# Patient Record
Sex: MALE | Race: WHITE | NOT HISPANIC OR LATINO | Employment: FULL TIME | URBAN - METROPOLITAN AREA
[De-identification: names, ages, dates, MRNs, and addresses within clinical notes are randomized per-mention and may not be internally consistent; named-entity substitution may affect disease eponyms.]

---

## 2017-02-10 ENCOUNTER — ALLSCRIPTS OFFICE VISIT (OUTPATIENT)
Dept: OTHER | Facility: OTHER | Age: 62
End: 2017-02-10

## 2017-03-10 ENCOUNTER — ALLSCRIPTS OFFICE VISIT (OUTPATIENT)
Dept: OTHER | Facility: OTHER | Age: 62
End: 2017-03-10

## 2017-08-18 ENCOUNTER — ALLSCRIPTS OFFICE VISIT (OUTPATIENT)
Dept: OTHER | Facility: OTHER | Age: 62
End: 2017-08-18

## 2018-01-09 NOTE — PROGRESS NOTES
Assessment    1  URTI (acute upper respiratory infection) (465 9) (J06 9)   2  BMI 27 0-27 9,adult (V85 23) (Z68 27)    Plan  URTI (acute upper respiratory infection)    · Amoxicillin 500 MG Oral Tablet; take 1 tablet every twelve hours   · Benzonatate 200 MG Oral Capsule; TAKE 1 CAPSULE 3 TIMES DAILY AS  NEEDED   · Fluticasone Propionate 50 MCG/ACT Nasal Suspension; Two sprays in each nostril  once daily   · Drink at least 6 glasses of water or juice a day ; Status:Complete;   Done: 58STO9470   · The following home treatments may soothe a sore throat ; Status:Complete;   Done:  [id number]   · Use a cough medicine to help you get adequate rest ; Status:Complete;   Done:  09UVY5795   · Call [phone] if: The cough is not gone in 10 days ; Status:Complete;   Done:  36UDQ9953   · Call [phone] if: The fever comes back after being normal for 2 days ;  Status:Complete;   Done: 25UIT3674   · Call [phone] if: The symptoms are not better in 7 days ; Status:Complete;   Done:  50BWV6800   · Call [phone] if: The symptoms seem worse ; Status:Complete;   Done:  55BME5657   · Call [phone] if: You have a productive cough and are bringing up secretions  (phlegm) ; Status:Complete;   Done: 55DLL5920   · Call [phone] if: Your temperature is higher than 101F ; Status:Complete;   Done:  37DQO6590   · Follow Up if Not Better Evaluation and Treatment  Follow-up  Status: Complete  Done:  31LZN9783    Discussion/Summary    Symptoms are viral  recommend supportive care    since it is the weekend--- written script for abx given only to use if symptoms worsen  if you need to use it ---call the office  f/u as needed  Patient is able to Self-Care  Possible side effects of new medications were reviewed with the patient/guardian today  The treatment plan was reviewed with the patient/guardian   The patient/guardian understands and agrees with the treatment plan   The patient was counseled regarding instructions for management, risk factor reductions, impressions, importance of compliance with treatment  Chief Complaint  patient presenting c/o sinus pressure / pain, post nasal drip and sore throat x 2 days sl/cma      History of Present Illness  HPI: Pt seen and examined  +sore throat and sinus pressure x 3 days  + phlegm when laying down  no fever  feels well otherwise  no n/v/d  on and off cough  tried claritin without success      Review of Systems    Constitutional: no fever and no chills  ENT: as noted in HPI  Cardiovascular: no chest pain and no palpitations  Respiratory: as noted in HPI  Gastrointestinal: no nausea, no vomiting and no diarrhea  Musculoskeletal: no arthralgias and no myalgias  Neurological: headache  Active Problems    1  Allergic rhinitis (477 9) (J30 9)   2  Benign essential hypertension (401 1) (I10)   3  Colonoscopy (Fiberoptic) Screening   4  Encounter for screening for malignant neoplasm of colon (V76 51) (Z12 11)   5  Esophageal reflux (530 81) (K21 9)   6  Noncompliance with treatment plan (V15 81) (Z91 11)    Past Medical History    1  Acute pansinusitis (461 8) (J01 40)   2  Acute upper respiratory infection (465 9) (J06 9)   3  History of Bicipital tendinitis of left shoulder (726 12) (M75 22)   4  History of acute bronchitis (V12 69) (Z87 09)   5  History of acute pharyngitis (V12 69) (Z87 09)   6  History of acute sinusitis (V12 69) (Z87 09)   7  History of bronchitis (V12 69) (Z87 09)   8  History of gastroesophageal reflux (GERD) (V12 79) (Z87 19)   9  History of low back pain (V13 59) (Z87 39)   10  History of pneumonia (V12 61) (Z87 01)   11  History of sciatica (V12 49) (Z86 69)   12  History of viral gastroenteritis (V12 09) (Z86 19)   13  Influenza due to unidentified influenza virus with other respiratory manifestation (487 1)    (J11 1)   14  Lateral epicondylitis, unspecified laterality (726 32) (M77 10)   15   History of Left foot pain (729 5) (M79 672)   16  Noninfectious Dermatological Conditions (709 9)   17  History of Rash and other nonspecific skin eruption (782 1) (R21)   18  Rotator cuff tendinitis, unspecified laterality (726 10) (M75 80)   19  History of Skin lesion (709 9) (L98 9)   20  History of Upper respiratory infection (465 9) (J06 9)   21  History of URTI (acute upper respiratory infection) (465 9) (J06 9)   22  Well adult on routine health check (V70 0) (Z00 00)  Active Problems And Past Medical History Reviewed: The active problems and past medical history were reviewed and updated today  Family History  Mother    1  No pertinent family history  Father    2  No pertinent family history    Social History    · Former smoker (I91 76) (J25 721)  The social history was reviewed and updated today  Current Meds   1  Lisinopril 10 MG Oral Tablet; TAKE 1 TABLET BY MOUTH ONCE A DAY; Therapy: 73FBG6481 to (Last Rx:30Oct2016)  Requested for: 30Oct2016 Ordered   2  Omeprazole 40 MG Oral Capsule Delayed Release; TAKE 1 CAPSULE BY MOUTH   ONCE A DAY; Therapy: 08NLC3212 to (Last Rx:12Jan2015)  Requested for: 12Jan2015 Ordered    The medication list was reviewed and updated today  Allergies    1  No Known Drug Allergies    Vitals   Recorded: 45GZQ4918 10:52AM Recorded: 80HOQ4939 10:36AM   Temperature  97 4 F   Heart Rate 84 102   Respiration  16   Systolic  242   Diastolic  82   Height  5 ft 11 in   Weight  199 lb 3 2 oz   BMI Calculated  27 78   BSA Calculated  2 11     Physical Exam    Constitutional   General appearance: No acute distress, well appearing and well nourished  Eyes   Conjunctiva and lids: No swelling, erythema, or discharge  Ears, Nose, Mouth, and Throat   External inspection of ears and nose: Normal     Otoscopic examination: Abnormal   b/l bulging  Oropharynx: Abnormal   +PND  Pulmonary   Respiratory effort: No increased work of breathing or signs of respiratory distress      Auscultation of lungs: Clear to auscultation, equal breath sounds bilaterally, no wheezes, no rales, no rhonci  Cardiovascular   Auscultation of heart: Normal rate and rhythm, normal S1 and S2, without murmurs      Psychiatric   Orientation to person, place and time: Normal          Signatures   Electronically signed by : Sandi Reyes DO; Mar 10 2017 11:28AM EST                       (Author)

## 2018-01-12 VITALS
WEIGHT: 199.2 LBS | TEMPERATURE: 97.4 F | SYSTOLIC BLOOD PRESSURE: 148 MMHG | HEART RATE: 84 BPM | BODY MASS INDEX: 27.89 KG/M2 | DIASTOLIC BLOOD PRESSURE: 82 MMHG | RESPIRATION RATE: 16 BRPM | HEIGHT: 71 IN

## 2018-01-13 VITALS
RESPIRATION RATE: 16 BRPM | BODY MASS INDEX: 27.58 KG/M2 | HEIGHT: 71 IN | DIASTOLIC BLOOD PRESSURE: 84 MMHG | TEMPERATURE: 98.3 F | WEIGHT: 197 LBS | SYSTOLIC BLOOD PRESSURE: 132 MMHG | HEART RATE: 88 BPM

## 2018-01-14 VITALS
HEART RATE: 96 BPM | RESPIRATION RATE: 16 BRPM | SYSTOLIC BLOOD PRESSURE: 160 MMHG | WEIGHT: 201 LBS | DIASTOLIC BLOOD PRESSURE: 90 MMHG | BODY MASS INDEX: 28.14 KG/M2 | HEIGHT: 71 IN | TEMPERATURE: 98.4 F

## 2018-02-06 DIAGNOSIS — I10 BENIGN ESSENTIAL HTN: Primary | ICD-10-CM

## 2018-02-07 ENCOUNTER — TELEPHONE (OUTPATIENT)
Dept: FAMILY MEDICINE CLINIC | Facility: CLINIC | Age: 63
End: 2018-02-07

## 2018-02-07 RX ORDER — LISINOPRIL 10 MG/1
1 TABLET ORAL DAILY
COMMUNITY
Start: 2012-09-21 | End: 2018-02-07 | Stop reason: SDUPTHER

## 2018-02-07 RX ORDER — LISINOPRIL 10 MG/1
TABLET ORAL
Qty: 30 TABLET | Refills: 0 | OUTPATIENT
Start: 2018-02-07

## 2018-02-07 RX ORDER — LISINOPRIL 10 MG/1
10 TABLET ORAL DAILY
Qty: 30 TABLET | Refills: 0 | Status: SHIPPED | OUTPATIENT
Start: 2018-02-07 | End: 2018-03-20 | Stop reason: DRUGHIGH

## 2018-03-20 ENCOUNTER — OFFICE VISIT (OUTPATIENT)
Dept: FAMILY MEDICINE CLINIC | Facility: CLINIC | Age: 63
End: 2018-03-20
Payer: COMMERCIAL

## 2018-03-20 VITALS
HEART RATE: 76 BPM | SYSTOLIC BLOOD PRESSURE: 140 MMHG | DIASTOLIC BLOOD PRESSURE: 100 MMHG | HEIGHT: 71 IN | BODY MASS INDEX: 27.72 KG/M2 | WEIGHT: 198 LBS | TEMPERATURE: 97.7 F

## 2018-03-20 DIAGNOSIS — I10 ESSENTIAL HYPERTENSION: Primary | ICD-10-CM

## 2018-03-20 PROCEDURE — 99213 OFFICE O/P EST LOW 20 MIN: CPT | Performed by: NURSE PRACTITIONER

## 2018-03-20 RX ORDER — LISINOPRIL AND HYDROCHLOROTHIAZIDE 12.5; 1 MG/1; MG/1
1 TABLET ORAL DAILY
Qty: 30 TABLET | Refills: 6 | Status: SHIPPED | OUTPATIENT
Start: 2018-03-20 | End: 2018-04-24

## 2018-03-20 NOTE — PROGRESS NOTES
Assessment/Plan:    Problem List Items Addressed This Visit     None      Visit Diagnoses     Essential hypertension    -  Primary    Relevant Medications    lisinopril-hydrochlorothiazide (PRINZIDE,ZESTORETIC) 10-12 5 MG per tablet        Patient's blood pressure is not controlled  Cardiovascular risk factors include: advanced age (older than 54 for men, 72 for women), hypertension and male gender  Current plan of care: add hctz to their medication regimen  Reviewed risks of hypertension and principles of treatment  Blood pressure goal <140/90  RTO in 4 weeks for BP check, labs    Patient Instructions     Chronic Hypertension   WHAT YOU NEED TO KNOW:   Hypertension is high blood pressure (BP)  Your BP is the force of your blood moving against the walls of your arteries  Normal BP is less than 120/80  Prehypertension is between 120/80 and 139/89  Hypertension is 140/90 or higher  Hypertension causes your BP to get so high that your heart has to work much harder than normal  This can damage your heart  Chronic hypertension is a long-term condition that you can control with a healthy lifestyle or medicines  A controlled blood pressure helps protect your organs, such as your heart, lungs, brain, and kidneys  DISCHARGE INSTRUCTIONS:   Call 911 for any of the following:   · You have discomfort in your chest that feels like squeezing, pressure, fullness, or pain  · You become confused or have difficulty speaking  · You suddenly feel lightheaded or have trouble breathing  · You have pain or discomfort in your back, neck, jaw, stomach, or arm  Return to the emergency department if:   · You have a severe headache or vision loss  · You have weakness in an arm or leg  Contact your healthcare provider if:   · You feel faint, dizzy, confused, or drowsy  · You have been taking your BP medicine and your BP is still higher than your healthcare provider says it should be      · You have questions or concerns about your condition or care  Medicines: You may need any of the following:  · Medicine  may be used to help lower your BP  You may need more than one type of medicine  Take the medicine exactly as directed  · Diuretics  help decrease extra fluid that collects in your body  This will help lower your BP  You may urinate more often while you take this medicine  · Cholesterol medicine  helps lower your cholesterol level  A low cholesterol level helps prevent heart disease and makes it easier to control your blood pressure  · Take your medicine as directed  Contact your healthcare provider if you think your medicine is not helping or if you have side effects  Tell him or her if you are allergic to any medicine  Keep a list of the medicines, vitamins, and herbs you take  Include the amounts, and when and why you take them  Bring the list or the pill bottles to follow-up visits  Carry your medicine list with you in case of an emergency  Follow up with your healthcare provider as directed: You will need to return to have your blood pressure checked and to have other lab tests done  Write down your questions so you remember to ask them during your visits  Manage chronic hypertension:  Talk with your healthcare provider about these and other ways to manage hypertension:  · Take your BP at home  Sit and rest for 5 minutes before you take your BP  Extend your arm and support it on a flat surface  Your arm should be at the same level as your heart  Follow the directions that came with your BP monitor  If possible, take at least 2 BP readings each time  Take your BP at least twice a day at the same times each day, such as morning and evening  Keep a record of your BP readings and bring it to your follow-up visits  Ask your healthcare provider what your blood pressure should be  · Limit sodium (salt) as directed  Too much sodium can affect your fluid balance   Check labels to find low-sodium or no-salt-added foods  Some low-sodium foods use potassium salts for flavor  Too much potassium can also cause health problems  Your healthcare provider will tell you how much sodium and potassium are safe for you to have in a day  He or she may recommend that you limit sodium to 2,300 mg a day  · Follow the meal plan recommended by your healthcare provider  A dietitian or your provider can give you more information on low-sodium plans or the DASH (Dietary Approaches to Stop Hypertension) eating plan  The DASH plan is low in sodium, unhealthy fats, and total fat  It is high in potassium, calcium, and fiber  · Exercise to maintain a healthy weight  Exercise at least 30 minutes per day, on most days of the week  This will help decrease your blood pressure  Ask about the best exercise plan for you  · Decrease stress  This may help lower your BP  Learn ways to relax, such as deep breathing or listening to music  · Limit alcohol  Women should limit alcohol to 1 drink a day  Men should limit alcohol to 2 drinks a day  A drink of alcohol is 12 ounces of beer, 5 ounces of wine, or 1½ ounces of liquor  · Do not smoke  Nicotine and other chemicals in cigarettes and cigars can increase your BP and also cause lung damage  Ask your healthcare provider for information if you currently smoke and need help to quit  E-cigarettes or smokeless tobacco still contain nicotine  Talk to your healthcare provider before you use these products  © 2017 [employer]  Information is for End User's use only and may not be sold, redistributed or otherwise used for commercial purposes  All illustrations and images included in CareNotes® are the copyrighted property of [employer]  or [employer]hu Walker  The above information is an  only  It is not intended as medical advice for individual conditions or treatments   Talk to your doctor, nurse or pharmacist before following any medical regimen to see if it is safe and effective for you  Return in about 4 weeks (around 4/17/2018)  Subjective:      Patient ID: Azalea Ricci is a 63 y o  male  Chief Complaint   Patient presents with    Hypertension     BP check / med refills        Hypertension   This is a chronic problem  The current episode started more than 1 year ago  The problem is unchanged  The problem is controlled  (Asymptomatic) There are no associated agents to hypertension  Risk factors for coronary artery disease include male gender  Past treatments include ACE inhibitors  The current treatment provides mild improvement  There are no compliance problems  no evidence  The following portions of the patient's history were reviewed and updated as appropriate: allergies, current medications, past family history, past medical history, past social history, past surgical history and problem list     Review of Systems   Constitutional: Negative  Eyes: Negative for visual disturbance  Respiratory: Negative  Cardiovascular: Negative  Neurological: Negative  Current Outpatient Prescriptions   Medication Sig Dispense Refill    lisinopril-hydrochlorothiazide (PRINZIDE,ZESTORETIC) 10-12 5 MG per tablet Take 1 tablet by mouth daily 30 tablet 6     No current facility-administered medications for this visit  Objective:    /100   Pulse 76   Temp 97 7 °F (36 5 °C)   Ht 5' 11" (1 803 m)   Wt 89 8 kg (198 lb)   BMI 27 62 kg/m²        Physical Exam   Constitutional: He is oriented to person, place, and time  He appears well-developed and well-nourished  No distress  Eyes: Conjunctivae and EOM are normal  Pupils are equal, round, and reactive to light  Neck: Carotid bruit is not present  No thyromegaly present  Cardiovascular: Normal rate, regular rhythm, normal heart sounds and intact distal pulses  No murmur heard    Pulmonary/Chest: Effort normal and breath sounds normal  No respiratory distress  Neurological: He is alert and oriented to person, place, and time  Vitals reviewed               Erlinda Noel, [address]

## 2018-03-20 NOTE — PATIENT INSTRUCTIONS

## 2018-04-24 ENCOUNTER — OFFICE VISIT (OUTPATIENT)
Dept: FAMILY MEDICINE CLINIC | Facility: CLINIC | Age: 63
End: 2018-04-24
Payer: COMMERCIAL

## 2018-04-24 VITALS
DIASTOLIC BLOOD PRESSURE: 94 MMHG | SYSTOLIC BLOOD PRESSURE: 158 MMHG | TEMPERATURE: 98.1 F | BODY MASS INDEX: 26.88 KG/M2 | WEIGHT: 192 LBS | HEART RATE: 84 BPM | HEIGHT: 71 IN

## 2018-04-24 DIAGNOSIS — Z13.220 LIPID SCREENING: ICD-10-CM

## 2018-04-24 DIAGNOSIS — K21.9 GASTROESOPHAGEAL REFLUX DISEASE WITHOUT ESOPHAGITIS: ICD-10-CM

## 2018-04-24 DIAGNOSIS — I10 BENIGN ESSENTIAL HYPERTENSION: Primary | ICD-10-CM

## 2018-04-24 DIAGNOSIS — Z79.899 DRUG THERAPY: ICD-10-CM

## 2018-04-24 DIAGNOSIS — Z13.1 DIABETES MELLITUS SCREENING: ICD-10-CM

## 2018-04-24 PROCEDURE — 99213 OFFICE O/P EST LOW 20 MIN: CPT | Performed by: NURSE PRACTITIONER

## 2018-04-24 PROCEDURE — 36415 COLL VENOUS BLD VENIPUNCTURE: CPT | Performed by: NURSE PRACTITIONER

## 2018-04-24 RX ORDER — LISINOPRIL AND HYDROCHLOROTHIAZIDE 20; 12.5 MG/1; MG/1
1 TABLET ORAL DAILY
Qty: 30 TABLET | Refills: 6 | Status: SHIPPED | OUTPATIENT
Start: 2018-04-24 | End: 2019-02-13 | Stop reason: SDUPTHER

## 2018-04-24 NOTE — PROGRESS NOTES
Assessment/Plan:    Problem List Items Addressed This Visit     Benign essential hypertension - Primary    Relevant Medications    lisinopril-hydrochlorothiazide (PRINZIDE,ZESTORETIC) 20-12 5 MG per tablet    Other Relevant Orders    CBC and Platelet    Lipid panel    TSH, 3rd generation    Magnesium    Comprehensive metabolic panel    Esophageal reflux    Relevant Orders    CBC and Platelet      Other Visit Diagnoses     Diabetes mellitus screening        Relevant Orders    Comprehensive metabolic panel    Lipid screening        Relevant Orders    Lipid panel    Drug therapy        Relevant Orders    CBC and Platelet    Lipid panel    TSH, 3rd generation    Magnesium    Comprehensive metabolic panel        Patient's blood pressure is not controlled  Cardiovascular risk factors include: advanced age (older than 54 for men, 72 for women), hypertension, male gender and sedentary lifestyle  Current plan of care: medication changes per orders, dietary sodium restriction, regular aerobic exercise, weight loss and reduce stress  Reviewed risks of hypertension and principles of treatment  Blood pressure goal <140/90  rto in 2 months for bp check    Labs drawn today    I have reviewed the  instructions with the patient, answering all questions to his satisfaction  Patient Instructions     Chronic Hypertension   WHAT YOU NEED TO KNOW:   Hypertension is high blood pressure (BP)  Your BP is the force of your blood moving against the walls of your arteries  Normal BP is less than 120/80  Prehypertension is between 120/80 and 139/89  Hypertension is 140/90 or higher  Hypertension causes your BP to get so high that your heart has to work much harder than normal  This can damage your heart  Chronic hypertension is a long-term condition that you can control with a healthy lifestyle or medicines  A controlled blood pressure helps protect your organs, such as your heart, lungs, brain, and kidneys     DISCHARGE INSTRUCTIONS: Call 911 for any of the following:   · You have discomfort in your chest that feels like squeezing, pressure, fullness, or pain  · You become confused or have difficulty speaking  · You suddenly feel lightheaded or have trouble breathing  · You have pain or discomfort in your back, neck, jaw, stomach, or arm  Return to the emergency department if:   · You have a severe headache or vision loss  · You have weakness in an arm or leg  Contact your healthcare provider if:   · You feel faint, dizzy, confused, or drowsy  · You have been taking your BP medicine and your BP is still higher than your healthcare provider says it should be  · You have questions or concerns about your condition or care  Medicines: You may need any of the following:  · Medicine  may be used to help lower your BP  You may need more than one type of medicine  Take the medicine exactly as directed  · Diuretics  help decrease extra fluid that collects in your body  This will help lower your BP  You may urinate more often while you take this medicine  · Cholesterol medicine  helps lower your cholesterol level  A low cholesterol level helps prevent heart disease and makes it easier to control your blood pressure  · Take your medicine as directed  Contact your healthcare provider if you think your medicine is not helping or if you have side effects  Tell him or her if you are allergic to any medicine  Keep a list of the medicines, vitamins, and herbs you take  Include the amounts, and when and why you take them  Bring the list or the pill bottles to follow-up visits  Carry your medicine list with you in case of an emergency  Follow up with your healthcare provider as directed: You will need to return to have your blood pressure checked and to have other lab tests done  Write down your questions so you remember to ask them during your visits    Manage chronic hypertension:  Talk with your healthcare provider about these and other ways to manage hypertension:  · Take your BP at home  Sit and rest for 5 minutes before you take your BP  Extend your arm and support it on a flat surface  Your arm should be at the same level as your heart  Follow the directions that came with your BP monitor  If possible, take at least 2 BP readings each time  Take your BP at least twice a day at the same times each day, such as morning and evening  Keep a record of your BP readings and bring it to your follow-up visits  Ask your healthcare provider what your blood pressure should be  · Limit sodium (salt) as directed  Too much sodium can affect your fluid balance  Check labels to find low-sodium or no-salt-added foods  Some low-sodium foods use potassium salts for flavor  Too much potassium can also cause health problems  Your healthcare provider will tell you how much sodium and potassium are safe for you to have in a day  He or she may recommend that you limit sodium to 2,300 mg a day  · Follow the meal plan recommended by your healthcare provider  A dietitian or your provider can give you more information on low-sodium plans or the DASH (Dietary Approaches to Stop Hypertension) eating plan  The DASH plan is low in sodium, unhealthy fats, and total fat  It is high in potassium, calcium, and fiber  · Exercise to maintain a healthy weight  Exercise at least 30 minutes per day, on most days of the week  This will help decrease your blood pressure  Ask about the best exercise plan for you  · Decrease stress  This may help lower your BP  Learn ways to relax, such as deep breathing or listening to music  · Limit alcohol  Women should limit alcohol to 1 drink a day  Men should limit alcohol to 2 drinks a day  A drink of alcohol is 12 ounces of beer, 5 ounces of wine, or 1½ ounces of liquor  · Do not smoke    Nicotine and other chemicals in cigarettes and cigars can increase your BP and also cause lung damage  Ask your healthcare provider for information if you currently smoke and need help to quit  E-cigarettes or smokeless tobacco still contain nicotine  Talk to your healthcare provider before you use these products  © 2017 [employer] Information is for End User's use only and may not be sold, redistributed or otherwise used for commercial purposes  All illustrations and images included in CareNotes® are the copyrighted property of [employer]  or [employer]hu Walker  The above information is an  only  It is not intended as medical advice for individual conditions or treatments  Talk to your doctor, nurse or pharmacist before following any medical regimen to see if it is safe and effective for you  Return in about 2 months (around 6/24/2018)  Subjective:      Patient ID: Raghav Koenig is a 63 y o  male  Chief Complaint   Patient presents with    Blood Pressure Check     follow up 1 month       Hypertension   This is a chronic problem  The current episode started more than 1 year ago  The problem is unchanged  The problem is controlled  Pertinent negatives include no anxiety, blurred vision, chest pain, headaches, malaise/fatigue, neck pain, orthopnea, palpitations, peripheral edema, PND, shortness of breath or sweats  (Asymptomatic) There are no associated agents to hypertension  Risk factors for coronary artery disease include male gender  Past treatments include ACE inhibitors  The current treatment provides mild improvement  There are no compliance problems  no evidence  The following portions of the patient's history were reviewed and updated as appropriate: allergies, current medications, past family history, past medical history, past social history, past surgical history and problem list     Review of Systems   Constitutional: Negative  Negative for malaise/fatigue  Eyes: Negative for blurred vision and visual disturbance     Respiratory: Negative  Negative for shortness of breath  Cardiovascular: Negative  Negative for chest pain, palpitations, orthopnea and PND  Gastrointestinal: Negative  Musculoskeletal: Negative for neck pain  Neurological: Negative  Negative for headaches  Psychiatric/Behavioral: Negative  Current Outpatient Prescriptions   Medication Sig Dispense Refill    lisinopril-hydrochlorothiazide (PRINZIDE,ZESTORETIC) 20-12 5 MG per tablet Take 1 tablet by mouth daily 30 tablet 6     No current facility-administered medications for this visit  Objective:    /94 (BP Location: Left arm, Patient Position: Sitting, Cuff Size: Adult)   Pulse 84   Temp 98 1 °F (36 7 °C) (Temporal)   Ht 5' 11" (1 803 m)   Wt 87 1 kg (192 lb)   BMI 26 78 kg/m²        Physical Exam   Constitutional: He is oriented to person, place, and time  He appears well-developed and well-nourished  No distress  Eyes: Conjunctivae and EOM are normal  Pupils are equal, round, and reactive to light  Neck: Carotid bruit is not present  No thyromegaly present  Cardiovascular: Normal rate, regular rhythm, normal heart sounds and intact distal pulses  No murmur heard  Pulmonary/Chest: Effort normal and breath sounds normal  No respiratory distress  Neurological: He is alert and oriented to person, place, and time  Vitals reviewed               [facility], [address] [facility]

## 2018-04-25 ENCOUNTER — DOCUMENTATION (OUTPATIENT)
Dept: FAMILY MEDICINE CLINIC | Facility: CLINIC | Age: 63
End: 2018-04-25

## 2018-04-25 DIAGNOSIS — E78.5 DYSLIPIDEMIA: Primary | ICD-10-CM

## 2018-04-25 LAB
ALBUMIN SERPL-MCNC: 4.7 G/DL (ref 3.6–4.8)
ALBUMIN/GLOB SERPL: 1.9 {RATIO} (ref 1.2–2.2)
ALP SERPL-CCNC: 52 IU/L (ref 39–117)
ALT SERPL-CCNC: 34 IU/L (ref 0–44)
AST SERPL-CCNC: 33 IU/L (ref 0–40)
BILIRUB SERPL-MCNC: 1 MG/DL (ref 0–1.2)
BUN SERPL-MCNC: 14 MG/DL (ref 8–27)
BUN/CREAT SERPL: 14 (ref 10–24)
CALCIUM SERPL-MCNC: 9.9 MG/DL (ref 8.6–10.2)
CHLORIDE SERPL-SCNC: 98 MMOL/L (ref 96–106)
CHOLEST SERPL-MCNC: 227 MG/DL (ref 100–199)
CO2 SERPL-SCNC: 29 MMOL/L (ref 18–29)
CREAT SERPL-MCNC: 1.03 MG/DL (ref 0.76–1.27)
ERYTHROCYTE [DISTWIDTH] IN BLOOD BY AUTOMATED COUNT: 14.7 % (ref 12.3–15.4)
GLOBULIN SER-MCNC: 2.5 G/DL (ref 1.5–4.5)
GLUCOSE SERPL-MCNC: 84 MG/DL (ref 65–99)
HCT VFR BLD AUTO: 46.2 % (ref 37.5–51)
HDLC SERPL-MCNC: 55 MG/DL
HGB BLD-MCNC: 15.8 G/DL (ref 13–17.7)
LDLC SERPL CALC-MCNC: 141 MG/DL (ref 0–99)
MAGNESIUM SERPL-MCNC: 2.3 MG/DL (ref 1.6–2.3)
MCH RBC QN AUTO: 29.3 PG (ref 26.6–33)
MCHC RBC AUTO-ENTMCNC: 34.2 G/DL (ref 31.5–35.7)
MCV RBC AUTO: 86 FL (ref 79–97)
MICRODELETION SYND BLD/T FISH: NORMAL
PLATELET # BLD AUTO: 253 X10E3/UL (ref 150–379)
POTASSIUM SERPL-SCNC: 5 MMOL/L (ref 3.5–5.2)
PROT SERPL-MCNC: 7.2 G/DL (ref 6–8.5)
RBC # BLD AUTO: 5.39 X10E6/UL (ref 4.14–5.8)
SL AMB EGFR AFRICAN AMERICAN: 91 ML/MIN/1.73
SL AMB EGFR NON AFRICAN AMERICAN: 79 ML/MIN/1.73
SL AMB VLDL CHOLESTEROL CALC: 31 MG/DL (ref 5–40)
SODIUM SERPL-SCNC: 140 MMOL/L (ref 134–144)
TRIGL SERPL-MCNC: 153 MG/DL (ref 0–149)
TSH SERPL DL<=0.005 MIU/L-ACNC: 1.54 UIU/ML (ref 0.45–4.5)
WBC # BLD AUTO: 7.7 X10E3/UL (ref 3.4–10.8)

## 2018-04-25 RX ORDER — ATORVASTATIN CALCIUM 10 MG/1
10 TABLET, FILM COATED ORAL DAILY
Qty: 30 TABLET | Refills: 6 | Status: SHIPPED | OUTPATIENT
Start: 2018-04-25 | End: 2018-12-13 | Stop reason: SDUPTHER

## 2018-05-07 ENCOUNTER — OFFICE VISIT (OUTPATIENT)
Dept: FAMILY MEDICINE CLINIC | Facility: CLINIC | Age: 63
End: 2018-05-07
Payer: COMMERCIAL

## 2018-05-07 VITALS
WEIGHT: 192 LBS | SYSTOLIC BLOOD PRESSURE: 150 MMHG | HEART RATE: 68 BPM | DIASTOLIC BLOOD PRESSURE: 80 MMHG | BODY MASS INDEX: 26.88 KG/M2 | TEMPERATURE: 98.1 F | HEIGHT: 71 IN

## 2018-05-07 DIAGNOSIS — M75.52 ACUTE BURSITIS OF LEFT SHOULDER: Primary | ICD-10-CM

## 2018-05-07 DIAGNOSIS — K21.9 GASTROESOPHAGEAL REFLUX DISEASE WITHOUT ESOPHAGITIS: ICD-10-CM

## 2018-05-07 PROCEDURE — 99213 OFFICE O/P EST LOW 20 MIN: CPT | Performed by: NURSE PRACTITIONER

## 2018-05-07 RX ORDER — OMEPRAZOLE 40 MG/1
40 CAPSULE, DELAYED RELEASE ORAL DAILY
Qty: 30 CAPSULE | Refills: 6 | Status: SHIPPED | OUTPATIENT
Start: 2018-05-07

## 2018-05-07 RX ORDER — KETOROLAC TROMETHAMINE 30 MG/ML
45 INJECTION, SOLUTION INTRAMUSCULAR; INTRAVENOUS ONCE
Status: COMPLETED | OUTPATIENT
Start: 2018-05-07 | End: 2018-05-07

## 2018-05-07 RX ORDER — METHYLPREDNISOLONE ACETATE 40 MG/ML
60 INJECTION, SUSPENSION INTRA-ARTICULAR; INTRALESIONAL; INTRAMUSCULAR; SOFT TISSUE ONCE
Status: COMPLETED | OUTPATIENT
Start: 2018-05-07 | End: 2018-05-07

## 2018-05-07 RX ADMIN — METHYLPREDNISOLONE ACETATE 60 MG: 40 INJECTION, SUSPENSION INTRA-ARTICULAR; INTRALESIONAL; INTRAMUSCULAR; SOFT TISSUE at 10:36

## 2018-05-07 RX ADMIN — KETOROLAC TROMETHAMINE 45 MG: 30 INJECTION, SOLUTION INTRAMUSCULAR; INTRAVENOUS at 10:35

## 2018-05-07 NOTE — PROGRESS NOTES
Subjective     Renard Geiger is a 63 y o  male who presents with left shoulder pain  The symptoms began a week ago  Aggravating factors: repetitive activity, heavy lifting  Pain is located diffusely throughout the shoulder and bicep, deltoid  Discomfort is described as aching and throbbing  Symptoms are exacerbated by repetitive movements and lying on the shoulder  Denies decrease ROM, paresthesias, fever, chills  Evaluation to date: none  Therapy to date includes: rest and OTC analgesics which are not very effective  Hx of similar sxs  The following portions of the patient's history were reviewed and updated as appropriate: allergies, current medications, past family history, past medical history, past social history, past surgical history and problem list     Review of Systems  Pertinent items are noted in HPI       Objective     /80 (BP Location: Left arm, Patient Position: Sitting, Cuff Size: Adult)   Pulse 68   Temp 98 1 °F (36 7 °C) (Temporal)   Ht 5' 11" (1 803 m)   Wt 87 1 kg (192 lb)   BMI 26 78 kg/m²   Right shoulder: normal active ROM, no tenderness, no impingement sign   Left shoulder: non-specific diffuse tenderness about the shoulder, full ROM, negative for impingement sign, sensory exam normal, motor exam normal and radial pulse intact       Assessment/Plan     Left shoulder pain  Natural history and expected course discussed  Questions answered  Educational material distributed  Reduction in offending activity  Gentle ROM exercises  Rest, ice, compression, and elevation (RICE) therapy  NSAIDs per medication orders  Follow up in 1 days  if not improved      Relative rest for 3 days  Sling for one week    Maddisonlisa Jacobson requesting refill of GERd med

## 2018-05-07 NOTE — PATIENT INSTRUCTIONS
Shoulder Bursitis   WHAT YOU NEED TO KNOW:   What is shoulder bursitis? Shoulder bursitis is inflammation of the bursa in your shoulder  The bursa is a fluid-filled sac that acts as a cushion between a bone and a tendon  A tendon is a cord of strong tissue that connects muscles to bones  What causes shoulder bursitis? · An injury, such as a fall    · Bacterial infection    · Overuse of the shoulder, such as when you paint or swim    · Bony growths that rub against and irritate the bursa and tendons  What are the signs and symptoms of shoulder bursitis? · Pain when you move your shoulder or raise your arm over your head    · Decreased movement of your arm and shoulder    · Redness or swelling    · Crunching or popping when you move your shoulder    · Shoulder and arm weakness  How is shoulder bursitis diagnosed? Your healthcare provider will examine your shoulder and ask about your injury or activities  You may need any of the following:  · Blood tests: You may need blood drawn to check for infection  Healthcare providers may also check for diseases that may be causing your bursitis, such as rheumatoid arthritis  · X-rays: These pictures will show bone position problems, arthritis, or a fracture  · MRI:  This scan uses powerful magnets and a computer to take pictures of your shoulder  An MRI may show tissue damage or arthritis  You may be given dye to help the pictures show up better  Tell the healthcare provider if you have ever had an allergic reaction to contrast dye  Do not enter the MRI room with anything metal  Metal can cause serious injury  Tell the healthcare provider if you have any metal in or on your body  · Fluid culture:  Healthcare providers use a needle to drain fluid from your bursa  The fluid will be sent to a lab and tested for infection  Removal of bursa fluid may also help relieve your symptoms  How is shoulder bursitis treated?    · Medicine:      ¨ NSAIDs:  These medicines decrease swelling, pain, and fever  NSAIDs are available without a doctor's order  Ask your healthcare provider which medicine is right for you  Ask how much to take and when to take it  Take as directed  NSAIDs can cause stomach bleeding and kidney problems if not taken correctly  ¨ Antibiotics: These help fight an infection caused by bacteria  You may need antibiotics if your bursitis is caused by infection  ¨ Steroid injection: This shot will help decrease pain and swelling  · Bursectomy: This is surgery to remove your bursa  It is only done when other treatments do not work  What are the risks of shoulder bursitis? The infection may spread to nearby joints  You may have long-term bursitis  This may include pain and severe limitation of movement  How can I manage my symptoms? · Rest:  Rest your shoulder as much as possible to decrease pain and swelling  Slowly start to do more each day  Return to your daily activities as directed  · Ice:  Ice helps decrease swelling and pain  Ice may also help prevent tissue damage  Use an ice pack, or put crushed ice in a plastic bag  Cover it with a towel and place it on your shoulder for 15 to 20 minutes, 3 to 4 times each day, as directed  · Heat:  Heat helps decrease pain and stiffness  Apply heat on the area for 15 to 20 minutes, 3 to 4 times each day, as directed  · Sleep position:  Try to avoid lying on your injured shoulder  You may be more comfortable if you sleep on your stomach or back  · Physical therapy:  A physical therapist teaches you exercises to help improve movement and strength, and to decrease pain  How can I prevent shoulder bursitis? Always stretch and do warmup and cool-down exercises before and after you exercise  This will help loosen your muscles and decrease stress on your shoulder  Rest between workouts  When should I contact my healthcare provider? · Your redness, pain, and swelling increase      · Your symptoms do not improve with treatment  · You have a fever  · You have questions or concerns about your condition or care  CARE AGREEMENT:   You have the right to help plan your care  Learn about your health condition and how it may be treated  Discuss treatment options with your caregivers to decide what care you want to receive  You always have the right to refuse treatment  The above information is an  only  It is not intended as medical advice for individual conditions or treatments  Talk to your doctor, nurse or pharmacist before following any medical regimen to see if it is safe and effective for you  © 2017 [facility] Information is for End User's use only and may not be sold, redistributed or otherwise used for commercial purposes  All illustrations and images included in CareNotes® are the copyrighted property of A D A M [employer]  or Raghu Walker

## 2018-05-07 NOTE — LETTER
May 7, 2018     Patient: Anand Rivera   YOB: 1955   Date of Visit: 5/7/2018       To Whom it May Concern:    Anand Rivera is under my professional care  He was seen in my office on 5/7/2018  He may return to work on 5/10/18  If you have any questions or concerns, please don't hesitate to call           Sincerely,          JOHN Siddiqui        CC: No Recipients

## 2018-05-15 ENCOUNTER — TRANSCRIBE ORDERS (OUTPATIENT)
Dept: RADIOLOGY | Facility: CLINIC | Age: 63
End: 2018-05-15

## 2018-05-15 ENCOUNTER — TELEPHONE (OUTPATIENT)
Dept: FAMILY MEDICINE CLINIC | Facility: CLINIC | Age: 63
End: 2018-05-15

## 2018-05-15 ENCOUNTER — APPOINTMENT (OUTPATIENT)
Dept: RADIOLOGY | Facility: CLINIC | Age: 63
End: 2018-05-15
Payer: COMMERCIAL

## 2018-05-15 ENCOUNTER — OFFICE VISIT (OUTPATIENT)
Dept: FAMILY MEDICINE CLINIC | Facility: CLINIC | Age: 63
End: 2018-05-15
Payer: COMMERCIAL

## 2018-05-15 VITALS
SYSTOLIC BLOOD PRESSURE: 140 MMHG | DIASTOLIC BLOOD PRESSURE: 90 MMHG | HEIGHT: 71 IN | HEART RATE: 80 BPM | WEIGHT: 192 LBS | BODY MASS INDEX: 26.88 KG/M2 | TEMPERATURE: 98.2 F

## 2018-05-15 DIAGNOSIS — R20.2 PARESTHESIAS: ICD-10-CM

## 2018-05-15 DIAGNOSIS — M25.512 ACUTE PAIN OF LEFT SHOULDER: Primary | ICD-10-CM

## 2018-05-15 DIAGNOSIS — M25.512 ACUTE PAIN OF LEFT SHOULDER: ICD-10-CM

## 2018-05-15 DIAGNOSIS — M75.42 IMPINGEMENT SYNDROME OF LEFT SHOULDER: ICD-10-CM

## 2018-05-15 PROCEDURE — 73030 X-RAY EXAM OF SHOULDER: CPT

## 2018-05-15 PROCEDURE — 99213 OFFICE O/P EST LOW 20 MIN: CPT | Performed by: NURSE PRACTITIONER

## 2018-05-15 RX ORDER — METHYLPREDNISOLONE 4 MG/1
TABLET ORAL
Qty: 21 TABLET | Refills: 0 | Status: SHIPPED | OUTPATIENT
Start: 2018-05-15 | End: 2018-07-08 | Stop reason: ALTCHOICE

## 2018-05-15 RX ORDER — TRAMADOL HYDROCHLORIDE 50 MG/1
50 TABLET ORAL EVERY 12 HOURS PRN
Qty: 30 TABLET | Refills: 0 | Status: SHIPPED | OUTPATIENT
Start: 2018-05-15 | End: 2018-06-04 | Stop reason: HOSPADM

## 2018-05-15 NOTE — TELEPHONE ENCOUNTER
CALLED PT ADVISED SAME, HE MADE APPT  W/ ORTHO BUT THEY CANT SEE HIM UNTIL MAY 25, HE SAID HES IN A LOT OF PAIN AND CAN HE HAVE SOMETHING FOR THE PAIN?   HE USES QUICK CHEK

## 2018-05-15 NOTE — PROGRESS NOTES
Subjective     Lars Pena is a 63 y o  male who presents with ongoing left shoulder pain  The symptoms began 2 weeks ago  Seen and evaluated in ffice 1 week ago  Was given toradol, depo medrol and advised aleve, relative rest, sling  He reports pain is no better  In fact he now c/o left forearm, hand paresthesias  Aggravating factors: repetitive activity, heavy lifting  Pain is located diffusely throughout the shoulder and bicep, deltoid  Discomfort is described as aching and throbbing  Symptoms are exacerbated by repetitive movements and lying on the shoulder  Denies decrease ROM, joint swelling or redness, fever, chills  The following portions of the patient's history were reviewed and updated as appropriate: allergies, current medications, past family history, past medical history, past social history, past surgical history and problem list     Review of Systems  Pertinent items are noted in HPI       Objective     /90 (BP Location: Left arm, Patient Position: Sitting, Cuff Size: Adult)   Pulse 80   Temp 98 2 °F (36 8 °C) (Temporal)   Ht 5' 11" (1 803 m)   Wt 87 1 kg (192 lb)   BMI 26 78 kg/m²   Right shoulder: normal active ROM, no tenderness, no impingement sign   Left shoulder: non-specific diffuse tenderness about the shoulder, full ROM, negative for impingement sign, sensory exam normal, motor exam normal and radial pulse intact       Assessment/Plan     Left shoulder pain  Cont plan of care  Check XR  Will need MRI but will defer to Ortho     Ortho referral    Work note given

## 2018-05-15 NOTE — LETTER
May 15, 2018     Patient: Chelo Awad   YOB: 1955   Date of Visit: 5/15/2018       To Whom It May Concern: It is my medical opinion that Chelo Awad may return to work on 5/17/18  If you have any questions or concerns, please don't hesitate to call           Sincerely,        JOHN Velazquez    CC: No Recipients

## 2018-05-15 NOTE — PATIENT INSTRUCTIONS
Shoulder Pain   AMBULATORY CARE:   Shoulder pain  is a common problem and can affect your daily activities  Pain can be caused by a problem within your shoulder  Shoulder pain may also be caused by pain that spreads to your shoulder from another part of your body  Seek care immediately if:   · You have severe pain  · You cannot move your arm or shoulder  · You have numbness or tingling in your shoulder or arm  Contact your healthcare provider if:   · Your pain gets worse or does not go away with treatment  · You have trouble moving your arm or shoulder  · You have questions or concerns about your condition or care  Treatment for shoulder pain  may include any of the following:  · Acetaminophen  decreases pain and fever  It is available without a doctor's order  Ask how much to take and how often to take it  Follow directions  Acetaminophen can cause liver damage if not taken correctly  · NSAIDs , such as ibuprofen, help decrease swelling, pain, and fever  This medicine is available with or without a doctor's order  NSAIDs can cause stomach bleeding or kidney problems in certain people  If you take blood thinner medicine, always ask your healthcare provider if NSAIDs are safe for you  Always read the medicine label and follow directions  · A steroid injection  may help decrease pain and swelling  · Surgery  may be needed for long-term pain and loss of function  Manage your symptoms:   · Apply ice  on your shoulder for 20 to 30 minutes every 2 hours or as directed  Use an ice pack, or put crushed ice in a plastic bag  Cover it with a towel  Ice helps prevent tissue damage and decreases swelling and pain  · Apply heat if ice does not help your symptoms  Apply heat on your shoulder for 20 to 30 minutes every 2 hours for as many days as directed  Heat helps decrease pain and muscle spasms  · Go to physical or occupational therapy as directed    A physical therapist teaches you exercises to help improve movement and strength, and to decrease pain  An occupational therapist teaches you skills to help with your daily activities  Prevent shoulder pain:   · Stretch and strengthen your shoulder  Use proper technique during exercises and sports  · Limit activities as directed  Try to avoid repeated overhead movements  Follow up with your healthcare provider or orthopedist as directed:  Write down your questions so you remember to ask them during your visits  © 2017 [facility] Information is for End User's use only and may not be sold, redistributed or otherwise used for commercial purposes  All illustrations and images included in CareNotes® are the copyrighted property of [employer] D A M , Inc  or Raghu Walker  The above information is an  only  It is not intended as medical advice for individual conditions or treatments  Talk to your doctor, nurse or pharmacist before following any medical regimen to see if it is safe and effective for you

## 2018-05-15 NOTE — TELEPHONE ENCOUNTER
----- Message from Lorin Pichardo, [address] sent at 5/15/2018 12:54 PM EDT -----  XR shows slight narrowing in joint  This may be causing sxs  MRI is in order however ortho should order the proper type

## 2018-05-15 NOTE — TELEPHONE ENCOUNTER
I escribed tramadol 50mg morning and night and medrol pack  He should review pharm info  No alcohol with tramadol  He should follow pack instructions for steroid   Enough tramadol ordered until he sees ortho

## 2018-05-25 ENCOUNTER — OFFICE VISIT (OUTPATIENT)
Dept: OBGYN CLINIC | Facility: CLINIC | Age: 63
End: 2018-05-25
Payer: COMMERCIAL

## 2018-05-25 ENCOUNTER — APPOINTMENT (OUTPATIENT)
Dept: RADIOLOGY | Facility: CLINIC | Age: 63
End: 2018-05-25
Payer: COMMERCIAL

## 2018-05-25 ENCOUNTER — TELEPHONE (OUTPATIENT)
Dept: PAIN MEDICINE | Facility: CLINIC | Age: 63
End: 2018-05-25

## 2018-05-25 VITALS
HEIGHT: 71 IN | SYSTOLIC BLOOD PRESSURE: 151 MMHG | DIASTOLIC BLOOD PRESSURE: 91 MMHG | BODY MASS INDEX: 27.66 KG/M2 | WEIGHT: 197.6 LBS | HEART RATE: 88 BPM

## 2018-05-25 DIAGNOSIS — M54.2 NECK PAIN: ICD-10-CM

## 2018-05-25 DIAGNOSIS — M54.12 RADICULOPATHY, CERVICAL REGION: Primary | ICD-10-CM

## 2018-05-25 DIAGNOSIS — R20.2 PARESTHESIAS: ICD-10-CM

## 2018-05-25 DIAGNOSIS — M25.512 ACUTE PAIN OF LEFT SHOULDER: ICD-10-CM

## 2018-05-25 PROCEDURE — 72040 X-RAY EXAM NECK SPINE 2-3 VW: CPT

## 2018-05-25 PROCEDURE — 99244 OFF/OP CNSLTJ NEW/EST MOD 40: CPT | Performed by: ORTHOPAEDIC SURGERY

## 2018-05-25 NOTE — PROGRESS NOTES
Assessment/Plan:  1  Radiculopathy, cervical region  MRI cervical spine wo contrast    Ambulatory referral to Pain Management   2  Acute pain of left shoulder  Ambulatory referral to Orthopedic Surgery    worsening pain  not responsive to NSAIDs, steroids   3  Paresthesias  Ambulatory referral to Orthopedic Surgery    left arm   4  Neck pain  XR spine cervical 2 or 3 vw injury     Scribe Attestation    I,:   Denver Reyes am acting as a scribe while in the presence of the attending physician :        I,:   Sharon Trinh MD personally performed the services described in this documentation    as scribed in my presence :              Plan  Upon physical examination and review of the x-rays, I suspect that Ed's symptoms are related to a cervical spine pathology  I am recommending a cervical spine MRI to further evaluate for a cervical disc herniation or nerve root pathology, and we'll assist in scheduling this in the office today  Additionally, I'm recommending he follow-up with Dr Angelica Meléndez to evaluate the results of the MRI and formulate a more detailed treatment plan  Karlie Reyes understands and is in agreement with this plan  Subjective:   Patient ID: Kristin White is a 63 y o  male  Karlie Reyes is a 70-year-old right-hand-dominant male here today for initial evaluation of his left shoulder pain  He is referred today by Dr Shasha Duarte, and accompanied today by his wife  He reports that his left shoulder pain started approximately 3 weeks ago when he awoke in the morning thinking he had just slept on his shoulder wrong  He reports the pain has continued since then, and he describes his pain as achy and constant in nature with activities such as driving, sleeping, or even when sitting  He reports he received an injection about 2 weeks ago, which failed to help improve his symptoms  He rates his pain as moderate in nature, and does report that ice helps reduce his pain   He has radiating pain from his left shoulder into his left upper arm, as well as tingling into his left fingers  He denies any prior left shoulder injury, but does report he has received injections in the past which has helped reduce his symptoms  He works as a  on the night shift, and reports she does a lot of pushing and pulling of equipment and material at work  Review of Systems   Constitutional: Negative for chills, fever and unexpected weight change  HENT: Negative for hearing loss, nosebleeds and sore throat  Eyes: Negative for pain, redness and visual disturbance  Respiratory: Negative for cough, shortness of breath and wheezing  Cardiovascular: Negative for chest pain, palpitations and leg swelling  Gastrointestinal: Negative for abdominal pain, nausea and vomiting  Endocrine: Negative for polydipsia and polyuria  Genitourinary: Negative for dysuria and hematuria  Musculoskeletal: Positive for arthralgias and myalgias  Negative for joint swelling  Skin: Negative for rash and wound  Neurological: Negative for dizziness, numbness and headaches  Psychiatric/Behavioral: Negative for decreased concentration and suicidal ideas  The patient is not nervous/anxious  Past Medical History:   Diagnosis Date    Acute pansinusitis     Bicipital tendinitis of left shoulder     GERD (gastroesophageal reflux disease)     Lateral epicondylitis     Noninfectious dermatoses of eyelid     Pneumonia     Rotator cuff tendinitis     Sciatica     Skin lesion        History reviewed  No pertinent surgical history  Family History   Problem Relation Age of Onset    No Known Problems Mother     No Known Problems Father        Social History     Occupational History    Not on file       Social History Main Topics    Smoking status: Former Smoker    Smokeless tobacco: Never Used    Alcohol use Not on file    Drug use: Unknown    Sexual activity: Not on file         Current Outpatient Prescriptions:    atorvastatin (LIPITOR) 10 mg tablet, Take 1 tablet (10 mg total) by mouth daily for 30 days, Disp: 30 tablet, Rfl: 6    lisinopril-hydrochlorothiazide (PRINZIDE,ZESTORETIC) 20-12 5 MG per tablet, Take 1 tablet by mouth daily, Disp: 30 tablet, Rfl: 6    Methylprednisolone 4 MG TBPK, Use as directed on package, Disp: 21 tablet, Rfl: 0    omeprazole (PriLOSEC) 40 MG capsule, Take 1 capsule (40 mg total) by mouth daily, Disp: 30 capsule, Rfl: 6    traMADol (ULTRAM) 50 mg tablet, Take 1 tablet (50 mg total) by mouth every 12 (twelve) hours as needed for moderate pain, Disp: 30 tablet, Rfl: 0    No Known Allergies    Objective:  Vitals:    05/25/18 1317   BP: 151/91   Pulse: 88       Back Exam     Tenderness   The patient is experiencing no tenderness  Range of Motion   The patient has normal back ROM  Extension: normal   Flexion: normal   Lateral Bend Right: normal   Lateral Bend Left: normal   Rotation Right: normal   Rotation Left: normal     Other   Sensation: normal    Comments:  Spurling's: Positive  Cervical Compression: Positive  Upper Quarter Screen- within normal limits, normal strength distally  Radicular symptoms: C5 and C6 dermatomal distribution      Left Shoulder Exam   Left shoulder exam is normal     Tenderness   The patient is experiencing no tenderness  Range of Motion   The patient has normal left shoulder ROM  Active Abduction: normal   Forward Flexion: normal   External Rotation: normal   Internal Rotation 0 degrees: normal     Muscle Strength   Abduction: 5/5   Internal Rotation: 5/5   External Rotation: 5/5   Supraspinatus: 5/5   Subscapularis: 5/5   Biceps: 5/5     Tests   Apprehension: negative  Cross Arm: negative  Drop Arm: negative  Hawkin's test: negative    Other   Scars: absent  Sensation: normal  Pulse: present     Comments:  Empty can: Negative  Arab's: Negative            Physical Exam   Constitutional: He is oriented to person, place, and time   He appears well-developed and well-nourished  HENT:   Head: Normocephalic  Nose: Nose normal    Eyes: Conjunctivae and EOM are normal    Neck: Normal range of motion  Cardiovascular: Normal rate and intact distal pulses  Pulmonary/Chest: Effort normal  No respiratory distress  Abdominal: Soft  He exhibits no distension  Musculoskeletal:        Left shoulder: He exhibits pain  He exhibits normal range of motion, no tenderness, no bony tenderness, normal pulse and normal strength  Cervical back: He exhibits normal range of motion, no tenderness, no bony tenderness, no pain and normal pulse  Arms:  Neurological: He is alert and oriented to person, place, and time  Skin: Skin is warm and dry  Psychiatric: He has a normal mood and affect  His behavior is normal  Judgment and thought content normal        I have personally reviewed pertinent films in PACS and my interpretation is Left shoulder x-rays are unremarkable, no obvious fracture or deformity noted  Cervical spine x-rays demonstrate arthritic changes at the C4/C5 level  The x-rays are otherwise unremarkable, no additional fracture or deformity noted

## 2018-05-25 NOTE — LETTER
May 25, 2018     Patient: Bobo Botello   YOB: 1955   Date of Visit: 5/25/2018       To Whom it May Concern:    Bobo Botello is under my professional care  He was seen in my office on 5/25/2018  He may return to work on 06/04/2018  If you have any questions or concerns, please don't hesitate to call           Sincerely,          Edin Montiel MD        CC: No Recipients

## 2018-05-25 NOTE — TELEPHONE ENCOUNTER
Intake started and completed at [facility] office patient ref by Dr Aleah Okeefe for Radiculopathy, cervical region  Patient scheduled with Dr Rosalio Marshall on 06/04/18 and gave patient new patient paperwork

## 2018-05-31 ENCOUNTER — HOSPITAL ENCOUNTER (OUTPATIENT)
Dept: RADIOLOGY | Facility: HOSPITAL | Age: 63
Discharge: HOME | End: 2018-05-31
Attending: ORTHOPAEDIC SURGERY
Payer: COMMERCIAL

## 2018-05-31 DIAGNOSIS — M54.12 RADICULOPATHY, CERVICAL REGION: ICD-10-CM

## 2018-05-31 PROCEDURE — 72141 MRI NECK SPINE W/O DYE: CPT

## 2018-06-04 ENCOUNTER — CONSULT (OUTPATIENT)
Dept: PAIN MEDICINE | Facility: CLINIC | Age: 63
End: 2018-06-04
Payer: COMMERCIAL

## 2018-06-04 VITALS
HEART RATE: 98 BPM | SYSTOLIC BLOOD PRESSURE: 120 MMHG | BODY MASS INDEX: 28.17 KG/M2 | WEIGHT: 201.2 LBS | RESPIRATION RATE: 18 BRPM | HEIGHT: 71 IN | DIASTOLIC BLOOD PRESSURE: 64 MMHG

## 2018-06-04 DIAGNOSIS — M50.322 DEGENERATION OF INTERVERTEBRAL DISC AT C5-C6 LEVEL: ICD-10-CM

## 2018-06-04 DIAGNOSIS — M54.12 RADICULOPATHY, CERVICAL REGION: ICD-10-CM

## 2018-06-04 DIAGNOSIS — M54.2 NECK PAIN: Primary | ICD-10-CM

## 2018-06-04 DIAGNOSIS — M48.02 CERVICAL SPINAL STENOSIS: ICD-10-CM

## 2018-06-04 PROCEDURE — 99244 OFF/OP CNSLTJ NEW/EST MOD 40: CPT | Performed by: ANESTHESIOLOGY

## 2018-06-04 RX ORDER — ASPIRIN 81 MG/1
81 TABLET ORAL DAILY
COMMUNITY

## 2018-06-04 RX ORDER — GABAPENTIN 300 MG/1
300 CAPSULE ORAL 3 TIMES DAILY
Qty: 90 CAPSULE | Refills: 0 | Status: SHIPPED | OUTPATIENT
Start: 2018-06-04 | End: 2018-10-02 | Stop reason: ALTCHOICE

## 2018-06-04 RX ORDER — ASCORBIC ACID 500 MG
500 TABLET ORAL DAILY
COMMUNITY

## 2018-06-04 RX ORDER — LANOLIN ALCOHOL/MO/W.PET/CERES
CREAM (GRAM) TOPICAL DAILY
COMMUNITY

## 2018-06-04 NOTE — PROGRESS NOTES
Assessment:  1  Neck pain    2  Radiculopathy, cervical region    3  Cervical spinal stenosis    4  Degeneration of intervertebral disc at C5-C6 level        Plan:  Orders Placed This Encounter   Procedures    Ambulatory referral to Physical Therapy     Standing Status:   Future     Standing Expiration Date:   12/4/2018     Referral Priority:   Routine     Referral Type:   Physical Therapy     Referral Reason:   Specialty Services Required     Requested Specialty:   Physical Therapy     Number of Visits Requested:   1     Expiration Date:   6/4/2019       New Medications Ordered This Visit   Medications    gabapentin (NEURONTIN) 300 mg capsule     Sig: Take 1 capsule (300 mg total) by mouth 3 (three) times a day     Dispense:  90 capsule     Refill:  0     My impressions and treatment recommendations were discussed in detail with the patient, who verbalized understanding and had no further questions  Given that the patient has signs and symptoms of neck pain and left upper extremity radiculopathy in the context of cervical spinal stenosis and cervical degenerative disc disease, I discussed the rationale of undergoing a C6-C7 cervical epidural steroid injection since this could be potentially therapeutic  The procedures, its risks, and benefits were explained in detail to the patient  Risks include but are not limited to bleeding, infection, hematoma formation, abscess formation, weakness, headache, failure the pain to improve, nerve irritation or damage, and potential worsening of the pain  The patient verbalized understanding and wished to proceed with the procedure  I also felt it reasonable to have the patient undergo a course of physical therapy 2-3 times per week for 4-6 weeks  In addition, I felt a reasonable to prescribe the patient gabapentin on a titration schedule to a goal dosage of gabapentin 300 mg 3 times daily  Side effects were reviewed with the patient      Follow-up is planned in 4 weeks time or sooner as warranted  Discharge instructions were provided  I personally saw and examined the patient and I agree with the above discussed plan of care  History of Present Illness:    Elton Jeffery is a 63 y o  male who presents to [facility] for initial evaluation of the above stated pain complaints  The patient has a past medical and chronic pain history as outlined in the assessment section  He was referred by Dr Bhupendra Gupta  At today's office visit, the patient's pain score is 9/10 on the verbal numerical pain rating scale  He has been reporting neck pain with left upper extremity radicular symptoms for the past 1 month  He describes his pain as moderate to severe and nearly constant in nature  He describes no typical pattern to his pain  The patient reports that his pain is dull/aching and pins and needles in the fingers    Patient reports that there are no pain worsening factors  Lying down and sitting increases pain  Does report that heat/ice treatment provides no pain relief  He is currently using ibuprofen 600 mg 3 times daily as needed for pain, but is not getting any relief of symptoms with this medication  He is currently employed full-time  for [employer]  Review of Systems:    Review of Systems   Constitutional: Negative for fever and unexpected weight change  HENT: Negative for trouble swallowing  Eyes: Negative for visual disturbance  Respiratory: Negative for shortness of breath and wheezing  Cardiovascular: Negative for chest pain and palpitations  Gastrointestinal: Negative for constipation, diarrhea, nausea and vomiting  Endocrine: Negative for cold intolerance, heat intolerance and polydipsia  Genitourinary: Negative for difficulty urinating and frequency  Musculoskeletal: Negative for arthralgias, gait problem, joint swelling and myalgias  Skin: Negative for rash     Neurological: Negative for dizziness, seizures, syncope, weakness and headaches  Hematological: Does not bruise/bleed easily  Psychiatric/Behavioral: Negative for dysphoric mood  All other systems reviewed and are negative  Patient Active Problem List   Diagnosis    Allergic rhinitis    Benign essential hypertension    Esophageal reflux    Neck pain    Radiculopathy, cervical region    Cervical spinal stenosis    Degeneration of intervertebral disc at C5-C6 level       Past Medical History:   Diagnosis Date    Acute pansinusitis     Bicipital tendinitis of left shoulder     GERD (gastroesophageal reflux disease)     Lateral epicondylitis     Noninfectious dermatoses of eyelid     Pneumonia     Rotator cuff tendinitis     Sciatica     Skin lesion        No past surgical history on file  Family History   Problem Relation Age of Onset    No Known Problems Mother     No Known Problems Father        Social History     Occupational History    Not on file       Social History Main Topics    Smoking status: Former Smoker    Smokeless tobacco: Never Used    Alcohol use Not on file    Drug use: Unknown    Sexual activity: Not on file         Current Outpatient Prescriptions:     lisinopril-hydrochlorothiazide (PRINZIDE,ZESTORETIC) 20-12 5 MG per tablet, Take 1 tablet by mouth daily, Disp: 30 tablet, Rfl: 6    Methylprednisolone 4 MG TBPK, Use as directed on package, Disp: 21 tablet, Rfl: 0    omeprazole (PriLOSEC) 40 MG capsule, Take 1 capsule (40 mg total) by mouth daily, Disp: 30 capsule, Rfl: 6    atorvastatin (LIPITOR) 10 mg tablet, Take 1 tablet (10 mg total) by mouth daily for 30 days, Disp: 30 tablet, Rfl: 6    gabapentin (NEURONTIN) 300 mg capsule, Take 1 capsule (300 mg total) by mouth 3 (three) times a day, Disp: 90 capsule, Rfl: 0    No Known Allergies    Physical Exam:    /64 (BP Location: Left arm, Patient Position: Sitting, Cuff Size: Standard)   Pulse 98   Resp 18   Ht 5' 11" (1 803 m) Wt 91 3 kg (201 lb 3 2 oz)   BMI 28 06 kg/m²     Constitutional: normal, well developed, well nourished, alert, in no distress and non-toxic and no overt pain behavior  Eyes: anicteric  HEENT: grossly intact  Neck: supple, symmetric, trachea midline and no masses   Pulmonary:even and unlabored  Cardiovascular:No edema or pitting edema present  Skin:Normal without rashes or lesions and well hydrated  Psychiatric:Mood and affect appropriate  Neurologic:Cranial Nerves II-XII grossly intact  Musculoskeletal:normal     Cervical Spine Exam    Appearance:  Normal lordosis  Palpation/Tenderness:  no tenderness or spasm  Sensory:  no sensory deficits noted  Range of Motion:  Flexion:  No limitation  without pain  Extension:  No limitation  without pain  Lateral Flexion - Left:  No limitation  without pain  Lateral Flexion - Right:  No limitation  without pain  Rotation - Left:  No limitation  without pain  Rotation - Right:  No limitation  without pain  Motor Strength:  Left Arm Flexion  5/5  Left Arm Extension  5/5  Right Arm Flexion  5/5  Right Arm Extension  5/5  Left Wrist Flexion  5/5  Left Wrist Extension  5/5  Left Finger Abduction  5/5  Right Finger Abduction  5/5  Left Pincer Grasp  5/5  Right Pincer Grasp  5/5  Left    5/5  Right   5/5  Reflexes:  Left Biceps:  2+   Right Biceps:  2+   Left Brachioradialis:  2+   Right Brachioradialis:  2+   Left Triceps:  2+   Right Triceps:  2+   Special Tests:  Left Spurlings:  negative  Right Spurlings  negative      Imaging  No orders to display   5/31/18  MRI CERVICAL SPINE WITHOUT CONTRAST     INDICATION: M54 12: Radiculopathy, cervical region left C5 radiculopathy      COMPARISON:  X-ray 5/25/2018     TECHNIQUE:  Sagittal T1, sagittal T2, sagittal inversion recovery, axial T2, axial  2D merge     IMAGE QUALITY:  Diagnostic     FINDINGS:     ALIGNMENT:  Minor straightening of normal cervical lordosis    Minor degree of congenital canal stenosis      MARROW SIGNAL:  Normal marrow signal is identified within the visualized bony structures  No discrete marrow lesion  Chronic reactive marrow changes C4-C5      CERVICAL AND VISUALIZED THORACIC CORD:  Normal signal within the visualized cord      PREVERTEBRAL AND PARASPINAL SOFT TISSUES:  Normal      VISUALIZED POSTERIOR FOSSA:  The visualized posterior fossa demonstrates no abnormal signal      CERVICAL DISC SPACES:     C2-C3:  Normal      C3-C4:  Minor facet     C4-C5:  Decreased disc height, circumferential bulge, bilateral facet and uncinate arthrosis moderate bilateral foraminal stenosis  Canal reduced to 7-8 mm  Moderate left greater than right foraminal stenosis      C5-C6:  Circumferential bulge, marginal osteophytes, bilateral facet and uncinate arthrosis  Mild right and moderate left foraminal stenosis  Canal 8-9 mm      C6-C7:  Minor bulge, small marginal osteophytes, facet and uncinate arthrosis without critical stenosis      C7-T1:  Normal      UPPER THORACIC DISC SPACES:  Normal      IMPRESSION:     Despite multilevel degenerative changes, extent of foraminal stenosis on the left does not appear to result in focal nerve root compression    Mild canal stenosis              Workstation performed: [id number]      Imaging     MRI cervical spine wo contrast (Order #[id number]) on 5/31/2018 - Imaging Information       Orders Placed This Encounter   Procedures    Ambulatory referral to Physical Therapy

## 2018-06-04 NOTE — LETTER
June 4, 2018     Patient: Lars Pena   YOB: 1955   Date of Visit: 6/4/2018       To Whom it May Concern:    Lars Pena is under my professional care  He was seen in my office on 6/4/2018  He may return to work on June 5, 2018  If you have any questions or concerns, please don't hesitate to call           Sincerely,          Caprice Alanis MD        CC: Lars Pena

## 2018-06-04 NOTE — LETTER
June 4, 2018     Lesly Keller MD  [facility] [address]    Patient: Migel Fajardo   YOB: 1955   Date of Visit: 6/4/2018       Dear Dr Kiersten Lopez: Thank you for referring Migel Fajardo to me for evaluation  Below are my notes for this consultation  If you have questions, please do not hesitate to call me  I look forward to following your patient along with you           Sincerely,        Anne Matos MD        CC: Syeda Minor, DO

## 2018-06-13 ENCOUNTER — HOSPITAL ENCOUNTER (OUTPATIENT)
Dept: RADIOLOGY | Facility: HOSPITAL | Age: 63
Setting detail: OUTPATIENT SURGERY
Discharge: HOME | End: 2018-06-13
Payer: COMMERCIAL

## 2018-06-13 ENCOUNTER — HOSPITAL ENCOUNTER (OUTPATIENT)
Facility: AMBULARY SURGERY CENTER | Age: 63
Setting detail: OUTPATIENT SURGERY
Discharge: HOME | End: 2018-06-13
Attending: ANESTHESIOLOGY | Admitting: ANESTHESIOLOGY
Payer: COMMERCIAL

## 2018-06-13 VITALS
TEMPERATURE: 98.4 F | RESPIRATION RATE: 18 BRPM | DIASTOLIC BLOOD PRESSURE: 94 MMHG | HEART RATE: 85 BPM | SYSTOLIC BLOOD PRESSURE: 158 MMHG | OXYGEN SATURATION: 99 %

## 2018-06-13 PROCEDURE — 72020 X-RAY EXAM OF SPINE 1 VIEW: CPT

## 2018-06-13 PROCEDURE — 62321 NJX INTERLAMINAR CRV/THRC: CPT | Performed by: ANESTHESIOLOGY

## 2018-06-13 RX ORDER — METHYLPREDNISOLONE ACETATE 80 MG/ML
INJECTION, SUSPENSION INTRA-ARTICULAR; INTRALESIONAL; INTRAMUSCULAR; SOFT TISSUE AS NEEDED
Status: DISCONTINUED | OUTPATIENT
Start: 2018-06-13 | End: 2018-06-13 | Stop reason: HOSPADM

## 2018-06-13 RX ORDER — LIDOCAINE WITH 8.4% SOD BICARB 0.9%(10ML)
SYRINGE (ML) INJECTION AS NEEDED
Status: DISCONTINUED | OUTPATIENT
Start: 2018-06-13 | End: 2018-06-13 | Stop reason: HOSPADM

## 2018-06-13 RX ORDER — METHYLPREDNISOLONE ACETATE 40 MG/ML
INJECTION, SUSPENSION INTRA-ARTICULAR; INTRALESIONAL; INTRAMUSCULAR; SOFT TISSUE AS NEEDED
Status: DISCONTINUED | OUTPATIENT
Start: 2018-06-13 | End: 2018-06-13 | Stop reason: HOSPADM

## 2018-06-13 NOTE — H&P (VIEW-ONLY)
Assessment:  1  Neck pain    2  Radiculopathy, cervical region    3  Cervical spinal stenosis    4  Degeneration of intervertebral disc at C5-C6 level        Plan:  Orders Placed This Encounter   Procedures    Ambulatory referral to Physical Therapy     Standing Status:   Future     Standing Expiration Date:   12/4/2018     Referral Priority:   Routine     Referral Type:   Physical Therapy     Referral Reason:   Specialty Services Required     Requested Specialty:   Physical Therapy     Number of Visits Requested:   1     Expiration Date:   6/4/2019       New Medications Ordered This Visit   Medications    gabapentin (NEURONTIN) 300 mg capsule     Sig: Take 1 capsule (300 mg total) by mouth 3 (three) times a day     Dispense:  90 capsule     Refill:  0     My impressions and treatment recommendations were discussed in detail with the patient, who verbalized understanding and had no further questions  Given that the patient has signs and symptoms of neck pain and left upper extremity radiculopathy in the context of cervical spinal stenosis and cervical degenerative disc disease, I discussed the rationale of undergoing a C6-C7 cervical epidural steroid injection since this could be potentially therapeutic  The procedures, its risks, and benefits were explained in detail to the patient  Risks include but are not limited to bleeding, infection, hematoma formation, abscess formation, weakness, headache, failure the pain to improve, nerve irritation or damage, and potential worsening of the pain  The patient verbalized understanding and wished to proceed with the procedure  I also felt it reasonable to have the patient undergo a course of physical therapy 2-3 times per week for 4-6 weeks  In addition, I felt a reasonable to prescribe the patient gabapentin on a titration schedule to a goal dosage of gabapentin 300 mg 3 times daily  Side effects were reviewed with the patient      Follow-up is planned in 4 weeks time or sooner as warranted  Discharge instructions were provided  I personally saw and examined the patient and I agree with the above discussed plan of care  History of Present Illness:    Anand Rivera is a 63 y o  male who presents to [facility] for initial evaluation of the above stated pain complaints  The patient has a past medical and chronic pain history as outlined in the assessment section  He was referred by Dr Melvi Dillon  At today's office visit, the patient's pain score is 9/10 on the verbal numerical pain rating scale  He has been reporting neck pain with left upper extremity radicular symptoms for the past 1 month  He describes his pain as moderate to severe and nearly constant in nature  He describes no typical pattern to his pain  The patient reports that his pain is dull/aching and pins and needles in the fingers    Patient reports that there are no pain worsening factors  Lying down and sitting increases pain  Does report that heat/ice treatment provides no pain relief  He is currently using ibuprofen 600 mg 3 times daily as needed for pain, but is not getting any relief of symptoms with this medication  He is currently employed full-time  for [employer]  Review of Systems:    Review of Systems   Constitutional: Negative for fever and unexpected weight change  HENT: Negative for trouble swallowing  Eyes: Negative for visual disturbance  Respiratory: Negative for shortness of breath and wheezing  Cardiovascular: Negative for chest pain and palpitations  Gastrointestinal: Negative for constipation, diarrhea, nausea and vomiting  Endocrine: Negative for cold intolerance, heat intolerance and polydipsia  Genitourinary: Negative for difficulty urinating and frequency  Musculoskeletal: Negative for arthralgias, gait problem, joint swelling and myalgias  Skin: Negative for rash     Neurological: Negative for dizziness, seizures, syncope, weakness and headaches  Hematological: Does not bruise/bleed easily  Psychiatric/Behavioral: Negative for dysphoric mood  All other systems reviewed and are negative  Patient Active Problem List   Diagnosis    Allergic rhinitis    Benign essential hypertension    Esophageal reflux    Neck pain    Radiculopathy, cervical region    Cervical spinal stenosis    Degeneration of intervertebral disc at C5-C6 level       Past Medical History:   Diagnosis Date    Acute pansinusitis     Bicipital tendinitis of left shoulder     GERD (gastroesophageal reflux disease)     Lateral epicondylitis     Noninfectious dermatoses of eyelid     Pneumonia     Rotator cuff tendinitis     Sciatica     Skin lesion        No past surgical history on file  Family History   Problem Relation Age of Onset    No Known Problems Mother     No Known Problems Father        Social History     Occupational History    Not on file       Social History Main Topics    Smoking status: Former Smoker    Smokeless tobacco: Never Used    Alcohol use Not on file    Drug use: Unknown    Sexual activity: Not on file         Current Outpatient Prescriptions:     lisinopril-hydrochlorothiazide (PRINZIDE,ZESTORETIC) 20-12 5 MG per tablet, Take 1 tablet by mouth daily, Disp: 30 tablet, Rfl: 6    Methylprednisolone 4 MG TBPK, Use as directed on package, Disp: 21 tablet, Rfl: 0    omeprazole (PriLOSEC) 40 MG capsule, Take 1 capsule (40 mg total) by mouth daily, Disp: 30 capsule, Rfl: 6    atorvastatin (LIPITOR) 10 mg tablet, Take 1 tablet (10 mg total) by mouth daily for 30 days, Disp: 30 tablet, Rfl: 6    gabapentin (NEURONTIN) 300 mg capsule, Take 1 capsule (300 mg total) by mouth 3 (three) times a day, Disp: 90 capsule, Rfl: 0    No Known Allergies    Physical Exam:    /64 (BP Location: Left arm, Patient Position: Sitting, Cuff Size: Standard)   Pulse 98   Resp 18   Ht 5' 11" (1 803 m) Wt 91 3 kg (201 lb 3 2 oz)   BMI 28 06 kg/m²     Constitutional: normal, well developed, well nourished, alert, in no distress and non-toxic and no overt pain behavior  Eyes: anicteric  HEENT: grossly intact  Neck: supple, symmetric, trachea midline and no masses   Pulmonary:even and unlabored  Cardiovascular:No edema or pitting edema present  Skin:Normal without rashes or lesions and well hydrated  Psychiatric:Mood and affect appropriate  Neurologic:Cranial Nerves II-XII grossly intact  Musculoskeletal:normal     Cervical Spine Exam    Appearance:  Normal lordosis  Palpation/Tenderness:  no tenderness or spasm  Sensory:  no sensory deficits noted  Range of Motion:  Flexion:  No limitation  without pain  Extension:  No limitation  without pain  Lateral Flexion - Left:  No limitation  without pain  Lateral Flexion - Right:  No limitation  without pain  Rotation - Left:  No limitation  without pain  Rotation - Right:  No limitation  without pain  Motor Strength:  Left Arm Flexion  5/5  Left Arm Extension  5/5  Right Arm Flexion  5/5  Right Arm Extension  5/5  Left Wrist Flexion  5/5  Left Wrist Extension  5/5  Left Finger Abduction  5/5  Right Finger Abduction  5/5  Left Pincer Grasp  5/5  Right Pincer Grasp  5/5  Left    5/5  Right   5/5  Reflexes:  Left Biceps:  2+   Right Biceps:  2+   Left Brachioradialis:  2+   Right Brachioradialis:  2+   Left Triceps:  2+   Right Triceps:  2+   Special Tests:  Left Spurlings:  negative  Right Spurlings  negative      Imaging  No orders to display   5/31/18  MRI CERVICAL SPINE WITHOUT CONTRAST     INDICATION: M54 12: Radiculopathy, cervical region left C5 radiculopathy      COMPARISON:  X-ray 5/25/2018     TECHNIQUE:  Sagittal T1, sagittal T2, sagittal inversion recovery, axial T2, axial  2D merge     IMAGE QUALITY:  Diagnostic     FINDINGS:     ALIGNMENT:  Minor straightening of normal cervical lordosis    Minor degree of congenital canal stenosis      MARROW SIGNAL:  Normal marrow signal is identified within the visualized bony structures  No discrete marrow lesion  Chronic reactive marrow changes C4-C5      CERVICAL AND VISUALIZED THORACIC CORD:  Normal signal within the visualized cord      PREVERTEBRAL AND PARASPINAL SOFT TISSUES:  Normal      VISUALIZED POSTERIOR FOSSA:  The visualized posterior fossa demonstrates no abnormal signal      CERVICAL DISC SPACES:     C2-C3:  Normal      C3-C4:  Minor facet     C4-C5:  Decreased disc height, circumferential bulge, bilateral facet and uncinate arthrosis moderate bilateral foraminal stenosis  Canal reduced to 7-8 mm  Moderate left greater than right foraminal stenosis      C5-C6:  Circumferential bulge, marginal osteophytes, bilateral facet and uncinate arthrosis  Mild right and moderate left foraminal stenosis  Canal 8-9 mm      C6-C7:  Minor bulge, small marginal osteophytes, facet and uncinate arthrosis without critical stenosis      C7-T1:  Normal      UPPER THORACIC DISC SPACES:  Normal      IMPRESSION:     Despite multilevel degenerative changes, extent of foraminal stenosis on the left does not appear to result in focal nerve root compression    Mild canal stenosis              Workstation performed: [id number]      Imaging     MRI cervical spine wo contrast (Order #[id number]) on 5/31/2018 - Imaging Information       Orders Placed This Encounter   Procedures    Ambulatory referral to Physical Therapy

## 2018-06-13 NOTE — DISCHARGE INSTRUCTIONS
Epidural Steroid Injection   WHAT YOU NEED TO KNOW:   An epidural steroid injection (JOEL) is a procedure to inject steroid medicine into the epidural space  The epidural space is between your spinal cord and vertebrae  Steroids reduce inflammation and fluid buildup in your spine that may be causing pain  You may be given pain medicine along with the steroids  ACTIVITY  · Do not drive or operate machinery today  · No strenuous activity today - bending, lifting, etc   · You may resume normal activites starting tomorrow - start slowly and as tolerated  · You may shower today, but no tub baths or hot tubs  · You may have numbness for several hours from the local anesthetic  Please use caution and common sense, especially with weight-bearing activities  CARE OF THE INJECTION SITE  · If you have soreness or pain, apply ice to the area today (20 minutes on/20 minutes off)  · Starting tomorrow, you may use warm, moist heat or ice if needed  · You may have an increase or change in your discomfort for 36-48 hours after your treatment  · Apply ice and continue with any pain medication you have been prescribed  · Notify [facility] if you have any of the following: redness, drainage, swelling, headache, stiff neck or fever above 100°F     SPECIAL INSTRUCTIONS  · Our office will contact you in approximately 7 days for a progress report  MEDICATIONS  · Continue to take all routine medications  · Our office may have instructed you to hold some medications  If you have a problem specifically related to your procedure, please call our office at [phone]  Problems not related to your procedure should be directed to your primary care physician

## 2018-06-13 NOTE — OP NOTE
ATTENDING PHYSICIAN:  Yelena Gonzalez MD     PROCEDURE:  Cervical epidural steroid injection with steroid and local anesthetic under fluoroscopy at the C6-C7 level  PREPROCEDURE DIAGNOSIS:  Neck pain and upper extremity radicular symptoms  POSTPROCEDURE DIAGNOSIS:  Neck pain and upper extremity radicular symptoms  ANESTHESIA:  Local     ESTIMATED BLOOD LOSS:  Minimal     COMPLICATIONS:  None  LOCATION:  [address]  CONSENT:  Today's procedure, its potential benefits as well as its risks and potential side effects were reviewed  Discussed risks of the procedure including bleeding, infection, nerve irritation or damage, reactions to the medications, headache, failure of the pain to improve, and potential worsening of the pain were explained to the patient who verbalized understanding and who wished to proceed  Written informed consent is thereby obtained  DESCRIPTION OF THE PROCEDURE:  After written informed consent was obtained, the patient was taken to the fluoroscopy suite and placed in the prone position  Anatomical landmarks were identified by way of fluoroscopy in multiple views  The skin of the cervical region was prepped and draped in the usual sterile fashion  Strict aseptic technique was utilized  The skin and subcutaneous tissues at the needle entry site were infiltrated with 3 mL of 1% preservative-free lidocaine using a 25-gauge 1-1/2-inch needle  A 20-gauge Tuohy needle was then incrementally advanced under fluoroscopy using a loss of resistance technique  Upon entering into the epidural space, a positive loss of resistance to air was noted and a characteristic "pop" was felt  Proper placement into the epidural space was confirmed with a hanging column technique where preservative-free normal saline was noted to flow freely to gravity in to the epidural space as well as by the administration of contrast to delineate the epidural space   There were no paresthesias reported  After negative aspiration for CSF or heme, a 6 mL injectate consisting of 1 mL of Depo-Medrol 80 mg/mL and 1 mL of Depo-Medrol 40 mg/mL mixed with 4 mL of preservative-free normal saline was slowly injected  The patient tolerated the procedure well and all needles were removed with the tips intact  Hemostasis was maintained  There were no apparent paresthesias or complications  The skin was wiped clean and a Band-Aid was placed as appropriate  The patient was monitored for an appropriate period of time following the procedure and remained hemodynamically stable and neurovascularly intact following the procedure  The patient was ultimately discharged to home with supervision in good condition and instructed to call the office in a few days for an update or sooner as warranted  I was present for and participated in all key and critical portions of this procedure      Deja Lambert MD  6/13/2018  1:14 PM

## 2018-06-21 ENCOUNTER — TELEPHONE (OUTPATIENT)
Dept: PAIN MEDICINE | Facility: CLINIC | Age: 63
End: 2018-06-21

## 2018-06-21 NOTE — TELEPHONE ENCOUNTER
Pt called back and said that he received 50% relief  He said for the first few days he felt great and now the pain is returning, he did go to the gym for an [employer] and was doing some light lifting but told him give it some time and ill get back to him next week at his 2wk  He was understanding

## 2018-06-21 NOTE — TELEPHONE ENCOUNTER
S/p C6 C7 Cervical Epidural Steroid Injection 6/13/18 by Dr Delcid Real follow up on 7/16/18 @ 8:45am     Spoke w/ pt wife and she said he was sleeping but she said he was c/o pain and its from his triceps to his elbow and she said he mentioned he rated his pain 4-5/10  He will call back to give his % of relief

## 2018-06-29 NOTE — TELEPHONE ENCOUNTER
Please see below  Please see previous message  Patient states that he is having left shoulder pain that goes into his left arm

## 2018-06-29 NOTE — TELEPHONE ENCOUNTER
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

## 2018-07-02 ENCOUNTER — TELEPHONE (OUTPATIENT)
Dept: PAIN MEDICINE | Facility: CLINIC | Age: 63
End: 2018-07-02

## 2018-07-02 PROBLEM — M54.2 CERVICALGIA: Status: ACTIVE | Noted: 2018-07-02

## 2018-07-02 PROBLEM — M48.02 SPINAL STENOSIS IN CERVICAL REGION: Status: ACTIVE | Noted: 2018-07-02

## 2018-07-02 NOTE — TELEPHONE ENCOUNTER
Scheduled pt for C6-C7 LARRY #2 for 7/13/18   Went over pre-procedure instructions below:    Pt is currently taking self prescribed baby aspirin and was instructed to hold for 6 days with last dose being on 7/6/18  Pt is also taking motrin which requires a 1 day hold and was instructed to hold last dose 7/11/18  Pt denies any other prescription blood thinners or Nsaids    Nothing to eat or drink 1 hr prior to procedure time  Need to arrange transportation  Proper clothing for procedure  If ill or placed on antibiotics please call to reschedule

## 2018-07-08 ENCOUNTER — HOSPITAL ENCOUNTER (EMERGENCY)
Facility: HOSPITAL | Age: 63
Discharge: HOME | End: 2018-07-08
Attending: EMERGENCY MEDICINE | Admitting: EMERGENCY MEDICINE
Payer: COMMERCIAL

## 2018-07-08 ENCOUNTER — APPOINTMENT (EMERGENCY)
Dept: RADIOLOGY | Facility: HOSPITAL | Age: 63
End: 2018-07-08
Payer: COMMERCIAL

## 2018-07-08 VITALS
HEART RATE: 104 BPM | OXYGEN SATURATION: 98 % | WEIGHT: 196 LBS | DIASTOLIC BLOOD PRESSURE: 98 MMHG | BODY MASS INDEX: 27.34 KG/M2 | RESPIRATION RATE: 18 BRPM | SYSTOLIC BLOOD PRESSURE: 174 MMHG | TEMPERATURE: 97.8 F

## 2018-07-08 DIAGNOSIS — Z04.1 EXAM FOLLOWING MVC (MOTOR VEHICLE COLLISION), NO APPARENT INJURY: Primary | ICD-10-CM

## 2018-07-08 LAB — GLUCOSE SERPL-MCNC: 108 MG/DL (ref 65–140)

## 2018-07-08 PROCEDURE — 70450 CT HEAD/BRAIN W/O DYE: CPT

## 2018-07-08 PROCEDURE — 82948 REAGENT STRIP/BLOOD GLUCOSE: CPT

## 2018-07-08 PROCEDURE — 72125 CT NECK SPINE W/O DYE: CPT

## 2018-07-08 PROCEDURE — 99284 EMERGENCY DEPT VISIT MOD MDM: CPT
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
## 2018-07-09 ENCOUNTER — VBI (OUTPATIENT)
Dept: ADMINISTRATIVE | Facility: OTHER | Age: 63
End: 2018-07-09
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
## 2018-07-13 ENCOUNTER — HOSPITAL ENCOUNTER (OUTPATIENT)
Dept: RADIOLOGY | Facility: HOSPITAL | Age: 63
Discharge: HOME | End: 2018-07-13
Payer: COMMERCIAL

## 2018-07-13 ENCOUNTER — HOSPITAL ENCOUNTER (OUTPATIENT)
Facility: AMBULARY SURGERY CENTER | Age: 63
Setting detail: OUTPATIENT SURGERY
Discharge: HOME | End: 2018-07-13
Attending: ANESTHESIOLOGY | Admitting: ANESTHESIOLOGY
Payer: COMMERCIAL

## 2018-07-13 VITALS
RESPIRATION RATE: 16 BRPM | DIASTOLIC BLOOD PRESSURE: 95 MMHG | HEART RATE: 73 BPM | TEMPERATURE: 97 F | SYSTOLIC BLOOD PRESSURE: 171 MMHG | OXYGEN SATURATION: 99 %

## 2018-07-13 DIAGNOSIS — R52 PAIN: ICD-10-CM

## 2018-07-13 PROCEDURE — 72020 X-RAY EXAM OF SPINE 1 VIEW: CPT

## 2018-07-13 PROCEDURE — 62321 NJX INTERLAMINAR CRV/THRC: CPT | Performed by: ANESTHESIOLOGY

## 2018-07-13 RX ORDER — METHYLPREDNISOLONE ACETATE 80 MG/ML
INJECTION, SUSPENSION INTRA-ARTICULAR; INTRALESIONAL; INTRAMUSCULAR; SOFT TISSUE AS NEEDED
Status: DISCONTINUED | OUTPATIENT
Start: 2018-07-13 | End: 2018-07-13 | Stop reason: HOSPADM

## 2018-07-13 RX ORDER — METHYLPREDNISOLONE ACETATE 40 MG/ML
INJECTION, SUSPENSION INTRA-ARTICULAR; INTRALESIONAL; INTRAMUSCULAR; SOFT TISSUE AS NEEDED
Status: DISCONTINUED | OUTPATIENT
Start: 2018-07-13 | End: 2018-07-13 | Stop reason: HOSPADM

## 2018-07-13 RX ORDER — LIDOCAINE WITH 8.4% SOD BICARB 0.9%(10ML)
SYRINGE (ML) INJECTION AS NEEDED
Status: DISCONTINUED | OUTPATIENT
Start: 2018-07-13 | End: 2018-07-13 | Stop reason: HOSPADM

## 2018-07-13 NOTE — H&P
History of Present Illness: The patient is a 63 y o  male who presents with complaints of neck pain  Patient Active Problem List   Diagnosis    Allergic rhinitis    Benign essential hypertension    Esophageal reflux    Neck pain    Radiculopathy, cervical region    Cervical spinal stenosis    Degeneration of intervertebral disc at C5-C6 level    Cervical radiculitis    Cervical pain    Degeneration of C5-C6 intervertebral disc    Spinal stenosis in cervical region    Cervicalgia       Past Medical History:   Diagnosis Date    Acute pansinusitis     Bicipital tendinitis of left shoulder     GERD (gastroesophageal reflux disease)     Hyperlipidemia     Hypertension     Lateral epicondylitis     Noninfectious dermatoses of eyelid     Pneumonia     Rotator cuff tendinitis     Sciatica     Skin lesion        Past Surgical History:   Procedure Laterality Date    EPIDURAL BLOCK INJECTION N/A 6/13/2018    Procedure: C6 C7 Cervical Epidural Steroid Injection;  Surgeon: Ajay Hernandez MD;  Location: [place] [address] MAIN OR;  Service: Pain Management        No current facility-administered medications for this encounter  No Known Allergies    Physical Exam:   Vitals:    07/13/18 0820   BP: 156/83   Pulse: 92   Resp: 18   Temp: (!) 97 °F (36 1 °C)   SpO2: 98%     General: Awake, Alert, Oriented x 3, Mood and affect appropriate  Respiratory: Respirations even and unlabored  Cardiovascular: Peripheral pulses intact; no edema  Musculoskeletal Exam:  Pain and cervical region  ASA Score:  2    Patient/Chart Verification  Patient ID Verified: Verbal, Armband  ID Band Applied: Yes  Consents Confirmed: Procedural  H&P( within 30 days) Verified: Yes  Interval H&P(within 24 hr) Complete (required for Outpatients and Surgery Admit only): Yes  Beta Blocker given : N/A  Pre-op Lab/Test Results Available: N/A  Does Patient Have a Prosthetic Device/Implant: No    Assessment:  Neck pain      Plan:  Proceed with C6-C7 cervical epidural steroid injection

## 2018-07-13 NOTE — DISCHARGE INSTRUCTIONS
Epidural Steroid Injection   WHAT YOU NEED TO KNOW:   An epidural steroid injection (JOEL) is a procedure to inject steroid medicine into the epidural space  The epidural space is between your spinal cord and vertebrae  Steroids reduce inflammation and fluid buildup in your spine that may be causing pain  You may be given pain medicine along with the steroids  ACTIVITY  · Do not drive or operate machinery today  · No strenuous activity today - bending, lifting, etc   · You may resume normal activites starting tomorrow - start slowly and as tolerated  · You may shower today, but no tub baths or hot tubs  · You may have numbness for several hours from the local anesthetic  Please use caution and common sense, especially with weight-bearing activities  CARE OF THE INJECTION SITE  · If you have soreness or pain, apply ice to the area today (20 minutes on/20 minutes off)  · Starting tomorrow, you may use warm, moist heat or ice if needed  · You may have an increase or change in your discomfort for 36-48 hours after your treatment  · Apply ice and continue with any pain medication you have been prescribed  · Notify [facility] if you have any of the following: redness, drainage, swelling, headache, stiff neck or fever above 100°F     SPECIAL INSTRUCTIONS  · Our office will contact you in approximately 7 days for a progress report  MEDICATIONS  · Continue to take all routine medications  · Our office may have instructed you to hold some medications  If you have a problem specifically related to your procedure, please call our office at [phone]  Problems not related to your procedure should be directed to your primary care physician

## 2018-07-13 NOTE — OP NOTE
ATTENDING PHYSICIAN:  Will De La Torre MD     PROCEDURE:  Cervical epidural steroid injection with steroid and local anesthetic under fluoroscopy at the C6-C7 level  PREPROCEDURE DIAGNOSIS:  Neck pain and upper extremity radicular symptoms  POSTPROCEDURE DIAGNOSIS:  Neck pain and upper extremity radicular symptoms  ANESTHESIA:  Local     ESTIMATED BLOOD LOSS:  Minimal     COMPLICATIONS:  None  LOCATION:  [address]  CONSENT:  Today's procedure, its potential benefits as well as its risks and potential side effects were reviewed  Discussed risks of the procedure including bleeding, infection, nerve irritation or damage, reactions to the medications, headache, failure of the pain to improve, and potential worsening of the pain were explained to the patient who verbalized understanding and who wished to proceed  Written informed consent is thereby obtained  DESCRIPTION OF THE PROCEDURE:  After written informed consent was obtained, the patient was taken to the fluoroscopy suite and placed in the prone position  Anatomical landmarks were identified by way of fluoroscopy in multiple views  The skin of the cervical region was prepped and draped in the usual sterile fashion  Strict aseptic technique was utilized  The skin and subcutaneous tissues at the needle entry site were infiltrated with 3 mL of 1% preservative-free lidocaine using a 25-gauge 1-1/2-inch needle  A 20-gauge Tuohy needle was then incrementally advanced under fluoroscopy using a loss of resistance technique  Upon entering into the epidural space, a positive loss of resistance to air was noted and a characteristic "pop" was felt  Proper placement into the epidural space was confirmed with a hanging column technique where preservative-free normal saline was noted to flow freely to gravity in to the epidural space as well as by the administration of contrast to delineate the epidural space   There were no paresthesias reported  After negative aspiration for CSF or heme, a 6 mL injectate consisting of 1 mL of Depo-Medrol 80 mg/mL and 1 mL of Depo-Medrol 40 mg/mL mixed with 4 mL of preservative-free normal saline was slowly injected  The patient tolerated the procedure well and all needles were removed with the tips intact  Hemostasis was maintained  There were no apparent paresthesias or complications  The skin was wiped clean and a Band-Aid was placed as appropriate  The patient was monitored for an appropriate period of time following the procedure and remained hemodynamically stable and neurovascularly intact following the procedure  The patient was ultimately discharged to home with supervision in good condition and instructed to call the office in a few days for an update or sooner as warranted  I was present for and participated in all key and critical portions of this procedure      Frank Garcia MD  7/13/2018  9:20 AM

## 2018-07-20 ENCOUNTER — TELEPHONE (OUTPATIENT)
Dept: PAIN MEDICINE | Facility: CLINIC | Age: 63
End: 2018-07-20

## 2018-07-20 NOTE — TELEPHONE ENCOUNTER
S/p C6 C7 Cervical Epidural Steroid Injection on 7/13/18 by Dr Haris Lewis follow up 8/9/18 @ 9am     Spoke w/ and he stated that he is doing really well and that he rates his pain 2/10 and he received 90% relief

## 2018-08-09 ENCOUNTER — OFFICE VISIT (OUTPATIENT)
Dept: PAIN MEDICINE | Facility: CLINIC | Age: 63
End: 2018-08-09
Payer: COMMERCIAL

## 2018-08-09 VITALS
DIASTOLIC BLOOD PRESSURE: 68 MMHG | WEIGHT: 201.2 LBS | HEIGHT: 73 IN | HEART RATE: 90 BPM | BODY MASS INDEX: 26.66 KG/M2 | RESPIRATION RATE: 16 BRPM | SYSTOLIC BLOOD PRESSURE: 108 MMHG

## 2018-08-09 DIAGNOSIS — M48.02 CERVICAL SPINAL STENOSIS: ICD-10-CM

## 2018-08-09 DIAGNOSIS — M54.12 RADICULOPATHY, CERVICAL REGION: ICD-10-CM

## 2018-08-09 DIAGNOSIS — M54.2 CERVICAL PAIN: Primary | ICD-10-CM

## 2018-08-09 DIAGNOSIS — M50.322 DEGENERATION OF INTERVERTEBRAL DISC AT C5-C6 LEVEL: ICD-10-CM

## 2018-08-09 PROCEDURE — 99213 OFFICE O/P EST LOW 20 MIN: CPT | Performed by: ANESTHESIOLOGY

## 2018-08-09 NOTE — PROGRESS NOTES
Pain Medicine Follow-Up Note    Assessment:  1  Cervical pain    2  Cervical spinal stenosis    3  Degeneration of intervertebral disc at C5-C6 level    4  Radiculopathy, cervical region        Plan:  My impressions and treatment recommendations were discussed in detail with the patient who verbalized understanding and had no further questions  The patient reports significant relief of symptoms following the C6-C7 cervical epidural steroid injection on July 13, 2018  He reports 80% relief of symptoms and currently states that his pain is 2/10 the verbal numerical pain rating scale  He does not require any interventional pain procedures at this time  I did discuss with the patient that should he require any further procedures in the future, would be happy to provide them for him  The patient verbalized understanding  Follow-up is planned with the patient on an as-needed basis  Discharge instructions were provided  I personally saw and examined the patient and I agree with the above discussed plan of care  History of Present Illness:    Kristin White is a 66 y o  male who presents to [facility] for interval re-evaluation of the above stated pain complaints  The patient has a past medical and chronic pain history as outlined in the assessment section  He was last seen on July 13, 2018 at which time he underwent a C6-C7 cervical epidural steroid injection  At today's office visit, the patient's pain score is 2/10 on the verbal numerical pain rating scale  The patient states that his pain is primarily in his left upper extremity  He describes his pain as completely relieved after the C6-C7 cervical epidural steroid injection on July 13, 2018  He is complaining of some pins and needle sensation in his left upper extremity, but very occasionally  He is very satisfied with the results of the injection   He has stopped using his gabapentin entirely at this point in time     Other than as stated above, the patient denies any interval changes in medications, medical condition, mental condition, symptoms, or allergies since the last office visit  Review of Systems:    Review of Systems   Respiratory: Negative for shortness of breath  Cardiovascular: Negative for chest pain  Gastrointestinal: Negative for constipation, diarrhea, nausea and vomiting  Musculoskeletal: Negative for arthralgias, gait problem, joint swelling and myalgias  Skin: Negative for rash  Neurological: Negative for dizziness, seizures and weakness  All other systems reviewed and are negative  Patient Active Problem List   Diagnosis    Allergic rhinitis    Benign essential hypertension    Esophageal reflux    Radiculopathy, cervical region    Cervical spinal stenosis    Degeneration of intervertebral disc at C5-C6 level    Cervical pain       Past Medical History:   Diagnosis Date    Acute pansinusitis     Bicipital tendinitis of left shoulder     GERD (gastroesophageal reflux disease)     Hyperlipidemia     Hypertension     Lateral epicondylitis     Noninfectious dermatoses of eyelid     Pneumonia     Rotator cuff tendinitis     Sciatica     Skin lesion        Past Surgical History:   Procedure Laterality Date    EPIDURAL BLOCK INJECTION N/A 6/13/2018    Procedure: C6 C7 Cervical Epidural Steroid Injection;  Surgeon: Katlin Tong MD;  Location: [place] [address] MAIN OR;  Service: Pain Management     EPIDURAL BLOCK INJECTION N/A 7/13/2018    Procedure: C6 C7 Cervical Epidural Steroid Injection (83596); Surgeon: Katlin Tong MD;  Location: [facility] MAIN OR;  Service: Pain Management        Family History   Problem Relation Age of Onset    No Known Problems Mother     No Known Problems Father        Social History     Occupational History    Not on file       Social History Main Topics    Smoking status: Former Smoker    Smokeless tobacco: Never Used    Alcohol use Not on file    Drug use: Unknown    Sexual activity: Not on file         Current Outpatient Prescriptions:     ascorbic acid (VITAMIN C) 500 mg tablet, Take 500 mg by mouth daily, Disp: , Rfl:     aspirin (ECOTRIN LOW STRENGTH) 81 mg EC tablet, Take 81 mg by mouth daily, Disp: , Rfl:     cyanocobalamin (VITAMIN B-12) 1,000 mcg tablet, Take by mouth daily, Disp: , Rfl:     gabapentin (NEURONTIN) 300 mg capsule, Take 1 capsule (300 mg total) by mouth 3 (three) times a day, Disp: 90 capsule, Rfl: 0    lisinopril-hydrochlorothiazide (PRINZIDE,ZESTORETIC) 20-12 5 MG per tablet, Take 1 tablet by mouth daily, Disp: 30 tablet, Rfl: 6    omeprazole (PriLOSEC) 40 MG capsule, Take 1 capsule (40 mg total) by mouth daily, Disp: 30 capsule, Rfl: 6    atorvastatin (LIPITOR) 10 mg tablet, Take 1 tablet (10 mg total) by mouth daily for 30 days, Disp: 30 tablet, Rfl: 6    No Known Allergies    Physical Exam:    /68 (BP Location: Left arm, Patient Position: Sitting, Cuff Size: Standard)   Pulse 90   Resp 16   Ht 6' 1" (1 854 m)   Wt 91 3 kg (201 lb 3 2 oz)   BMI 26 55 kg/m²     Constitutional:normal, well developed, well nourished, alert, in no distress and non-toxic and no overt pain behavior    Eyes:anicteric  HEENT:grossly intact  Neck:supple, symmetric, trachea midline and no masses   Pulmonary:even and unlabored  Cardiovascular:No edema or pitting edema present  Skin:Normal without rashes or lesions and well hydrated  Psychiatric:Mood and affect appropriate  Neurologic:Cranial Nerves II-XII grossly intact  Musculoskeletal:normal

## 2018-08-09 NOTE — LETTER
August 9, 2018     Devonte Raya, [address] [address]    Patient: Moira Blackmon   YOB: 1955   Date of Visit: 8/9/2018       Dear Dr Chand Area: Thank you for referring Moira Blackmon to me for evaluation  Below are my notes for this consultation  If you have questions, please do not hesitate to call me  I look forward to following your patient along with you  Sincerely,        Stella Huggins MD        CC: No Recipients  Stella Huggins MD  8/9/2018  9:06 AM  Sign at close encounter  Pain Medicine Follow-Up Note    Assessment:  1  Cervical pain    2  Cervical spinal stenosis    3  Degeneration of intervertebral disc at C5-C6 level    4  Radiculopathy, cervical region        Plan:  My impressions and treatment recommendations were discussed in detail with the patient who verbalized understanding and had no further questions  The patient reports significant relief of symptoms following the C6-C7 cervical epidural steroid injection on July 13, 2018  He reports 80% relief of symptoms and currently states that his pain is 2/10 the verbal numerical pain rating scale  He does not require any interventional pain procedures at this time  I did discuss with the patient that should he require any further procedures in the future, would be happy to provide them for him  The patient verbalized understanding  Follow-up is planned with the patient on an as-needed basis  Discharge instructions were provided  I personally saw and examined the patient and I agree with the above discussed plan of care  History of Present Illness:    Moira Blackmon is a 66 y o  male who presents to [facility] for interval re-evaluation of the above stated pain complaints  The patient has a past medical and chronic pain history as outlined in the assessment section   He was last seen on July 13, 2018 at which time he underwent a C6-C7 cervical epidural steroid injection  At today's office visit, the patient's pain score is 2/10 on the verbal numerical pain rating scale  The patient states that his pain is primarily in his left upper extremity  He describes his pain as completely relieved after the C6-C7 cervical epidural steroid injection on July 13, 2018  He is complaining of some pins and needle sensation in his left upper extremity, but very occasionally  He is very satisfied with the results of the injection  He has stopped using his gabapentin entirely at this point in time  Other than as stated above, the patient denies any interval changes in medications, medical condition, mental condition, symptoms, or allergies since the last office visit  Review of Systems:    Review of Systems   Respiratory: Negative for shortness of breath  Cardiovascular: Negative for chest pain  Gastrointestinal: Negative for constipation, diarrhea, nausea and vomiting  Musculoskeletal: Negative for arthralgias, gait problem, joint swelling and myalgias  Skin: Negative for rash  Neurological: Negative for dizziness, seizures and weakness  All other systems reviewed and are negative          Patient Active Problem List   Diagnosis    Allergic rhinitis    Benign essential hypertension    Esophageal reflux    Radiculopathy, cervical region    Cervical spinal stenosis    Degeneration of intervertebral disc at C5-C6 level    Cervical pain       Past Medical History:   Diagnosis Date    Acute pansinusitis     Bicipital tendinitis of left shoulder     GERD (gastroesophageal reflux disease)     Hyperlipidemia     Hypertension     Lateral epicondylitis     Noninfectious dermatoses of eyelid     Pneumonia     Rotator cuff tendinitis     Sciatica     Skin lesion        Past Surgical History:   Procedure Laterality Date    EPIDURAL BLOCK INJECTION N/A 6/13/2018    Procedure: C6 C7 Cervical Epidural Steroid Injection;  Surgeon: Shauna Steiner MD; Location: [facility] [place] MAIN OR;  Service: Pain Management     EPIDURAL BLOCK INJECTION N/A 7/13/2018    Procedure: C6 C7 Cervical Epidural Steroid Injection (72060); Surgeon: Filemon Grewal MD;  Location: [facility] MAIN OR;  Service: Pain Management        Family History   Problem Relation Age of Onset    No Known Problems Mother     No Known Problems Father        Social History     Occupational History    Not on file  Social History Main Topics    Smoking status: Former Smoker    Smokeless tobacco: Never Used    Alcohol use Not on file    Drug use: Unknown    Sexual activity: Not on file         Current Outpatient Prescriptions:     ascorbic acid (VITAMIN C) 500 mg tablet, Take 500 mg by mouth daily, Disp: , Rfl:     aspirin (ECOTRIN LOW STRENGTH) 81 mg EC tablet, Take 81 mg by mouth daily, Disp: , Rfl:     cyanocobalamin (VITAMIN B-12) 1,000 mcg tablet, Take by mouth daily, Disp: , Rfl:     gabapentin (NEURONTIN) 300 mg capsule, Take 1 capsule (300 mg total) by mouth 3 (three) times a day, Disp: 90 capsule, Rfl: 0    lisinopril-hydrochlorothiazide (PRINZIDE,ZESTORETIC) 20-12 5 MG per tablet, Take 1 tablet by mouth daily, Disp: 30 tablet, Rfl: 6    omeprazole (PriLOSEC) 40 MG capsule, Take 1 capsule (40 mg total) by mouth daily, Disp: 30 capsule, Rfl: 6    atorvastatin (LIPITOR) 10 mg tablet, Take 1 tablet (10 mg total) by mouth daily for 30 days, Disp: 30 tablet, Rfl: 6    No Known Allergies    Physical Exam:    /68 (BP Location: Left arm, Patient Position: Sitting, Cuff Size: Standard)   Pulse 90   Resp 16   Ht 6' 1" (1 854 m)   Wt 91 3 kg (201 lb 3 2 oz)   BMI 26 55 kg/m²      Constitutional:normal, well developed, well nourished, alert, in no distress and non-toxic and no overt pain behavior    Eyes:anicteric  HEENT:grossly intact  Neck:supple, symmetric, trachea midline and no masses   Pulmonary:even and unlabored  Cardiovascular:No edema or pitting edema present  Skin:Normal without rashes or lesions and well hydrated  Psychiatric:Mood and affect appropriate  Neurologic:Cranial Nerves II-XII grossly intact  Musculoskeletal:normal

## 2018-09-28 ENCOUNTER — TELEPHONE (OUTPATIENT)
Dept: PAIN MEDICINE | Facility: CLINIC | Age: 63
End: 2018-09-28

## 2018-10-02 ENCOUNTER — OFFICE VISIT (OUTPATIENT)
Dept: PAIN MEDICINE | Facility: CLINIC | Age: 63
End: 2018-10-02
Payer: COMMERCIAL

## 2018-10-02 VITALS
WEIGHT: 200.6 LBS | DIASTOLIC BLOOD PRESSURE: 80 MMHG | HEIGHT: 73 IN | HEART RATE: 97 BPM | SYSTOLIC BLOOD PRESSURE: 140 MMHG | BODY MASS INDEX: 26.59 KG/M2

## 2018-10-02 DIAGNOSIS — M54.12 RADICULOPATHY, CERVICAL REGION: ICD-10-CM

## 2018-10-02 DIAGNOSIS — M50.322 DEGENERATION OF INTERVERTEBRAL DISC AT C5-C6 LEVEL: ICD-10-CM

## 2018-10-02 DIAGNOSIS — M54.2 CERVICAL PAIN: Primary | ICD-10-CM

## 2018-10-02 DIAGNOSIS — M48.02 CERVICAL SPINAL STENOSIS: ICD-10-CM

## 2018-10-02 PROCEDURE — 99214 OFFICE O/P EST MOD 30 MIN: CPT | Performed by: ANESTHESIOLOGY

## 2018-10-02 RX ORDER — NORTRIPTYLINE HYDROCHLORIDE 25 MG/1
25 CAPSULE ORAL DAILY
Qty: 30 CAPSULE | Refills: 0 | Status: ON HOLD | OUTPATIENT
Start: 2018-10-02 | End: 2019-05-31

## 2018-10-02 NOTE — PROGRESS NOTES
Pain Medicine Follow-Up Note    Assessment:  1  Cervical pain    2  Cervical spinal stenosis    3  Degeneration of intervertebral disc at C5-C6 level    4  Radiculopathy, cervical region        Plan:  Orders Placed This Encounter   Procedures    Ambulatory referral to Neurosurgery     Standing Status:   Future     Standing Expiration Date:   4/2/2019     Referral Priority:   Routine     Referral Type:   Consult - AMB     Referral Reason:   Specialty Services Required     Referred to Provider:   Colin Wang MD     Requested Specialty:   Neurosurgery     Number of Visits Requested:   1     Expiration Date:   10/2/2019       New Medications Ordered This Visit   Medications    nortriptyline (PAMELOR) 25 mg capsule     Sig: Take 1 capsule (25 mg total) by mouth daily     Dispense:  30 capsule     Refill:  0     My impressions and treatment recommendations were discussed in detail with the patient who verbalized understanding and had no further questions  The patient is complaining of neck pain and left upper extremity radicular symptoms  He did report about 3 months worth of relief following the previous C6-C7 cervical epidural steroid injection in July 2018  He is amenable to undergoing a repeat injection at this time since he did get good benefit for several months duration  As such, I felt a reasonable to have the patient undergo a C6-C7 cervical epidural steroid injection since this could be potentially therapeutic  The procedures, its risks, and benefits were explained in detail to the patient  Risks include but are not limited to bleeding, infection, hematoma formation, abscess formation, weakness, headache, failure the pain to improve, nerve irritation or damage, and potential worsening of the pain  The patient verbalized understanding and wished to proceed with the procedure  In addition, the patient states that his symptoms are significantly affecting him at work    He would like to know more long-term treatment options  I did state to the patient that if he is having such difficulty at work, I could refer him to a neurosurgeon for possible cervical spine surgery  The patient stated that he would like to meet with a neurosurgeon to discuss his options  As such, I referred him to meet with Dr Katrina Rodrigues  The patient also reports that gabapentin is not significantly alleviating his pain  He would like to trial a different medicine to help improve his overall pain  As such, I felt a reasonable to trial the patient on nortriptyline 25 mg once daily  Side effects were reviewed with the patient  Follow-up is planned in 4 weeks time or sooner as warranted  Discharge instructions were provided  I personally saw and examined the patient and I agree with the above discussed plan of care  History of Present Illness:    Khloe Recio is a 66 y o  male who presents to [facility] for interval re-evaluation of the above stated pain complaints  The patient has a past medical and chronic pain history as outlined in the assessment section  He was last seen on August 9, 2018 at which time he was maintained on gabapentin 300 mg 3 times daily  Of note, the patient did have C6-C7 cervical epidural steroid injections both in June and July 2018  At today's office visit, the patient's pain score is 8/10 on the verbal numerical pain rating scale  The patient states that his pain is primarily in his neck with radiation to the left upper extremity  He states that his pain is worse in morning, evening, and night  His pain is constant in nature and he reports the quality of his pain as sharp    He states that he had about 2 5-3 months worth of relief following the most recent cervical epidural steroid injection in July 2018  He states that his pain did return and he would like to undergo repeat cervical epidural steroid injection at this time    He also reports that he would like some long-term relief options and states that he is amenable to speaking to a surgeon about his options  Other than as stated above, the patient denies any interval changes in medications, medical condition, mental condition, symptoms, or allergies since the last office visit  Review of Systems:    Review of Systems   Respiratory: Negative for shortness of breath  Cardiovascular: Negative for chest pain  Gastrointestinal: Negative for constipation, diarrhea, nausea and vomiting  Musculoskeletal: Negative for arthralgias, gait problem, joint swelling and myalgias  Skin: Negative for rash  Neurological: Negative for dizziness, seizures and weakness  All other systems reviewed and are negative  Patient Active Problem List   Diagnosis    Allergic rhinitis    Benign essential hypertension    Esophageal reflux    Radiculopathy, cervical region    Cervical spinal stenosis    Degeneration of intervertebral disc at C5-C6 level    Cervical pain       Past Medical History:   Diagnosis Date    Acute pansinusitis     Bicipital tendinitis of left shoulder     GERD (gastroesophageal reflux disease)     Hyperlipidemia     Hypertension     Lateral epicondylitis     Noninfectious dermatoses of eyelid     Pneumonia     Rotator cuff tendinitis     Sciatica     Skin lesion        Past Surgical History:   Procedure Laterality Date    EPIDURAL BLOCK INJECTION N/A 6/13/2018    Procedure: C6 C7 Cervical Epidural Steroid Injection;  Surgeon: Paramjit Hunt MD;  Location: [place] [address] MAIN OR;  Service: Pain Management     EPIDURAL BLOCK INJECTION N/A 7/13/2018    Procedure: C6 C7 Cervical Epidural Steroid Injection (13048); Surgeon: Paramjit Hunt MD;  Location: [facility] MAIN OR;  Service: Pain Management        Family History   Problem Relation Age of Onset    No Known Problems Mother     No Known Problems Father        Social History     Occupational History    Not on file       Social History Main Topics    Smoking status: Former Smoker    Smokeless tobacco: Never Used    Alcohol use Not on file    Drug use: Unknown    Sexual activity: Not on file         Current Outpatient Prescriptions:     ascorbic acid (VITAMIN C) 500 mg tablet, Take 500 mg by mouth daily, Disp: , Rfl:     aspirin (ECOTRIN LOW STRENGTH) 81 mg EC tablet, Take 81 mg by mouth daily, Disp: , Rfl:     cyanocobalamin (VITAMIN B-12) 1,000 mcg tablet, Take by mouth daily, Disp: , Rfl:     lisinopril-hydrochlorothiazide (PRINZIDE,ZESTORETIC) 20-12 5 MG per tablet, Take 1 tablet by mouth daily, Disp: 30 tablet, Rfl: 6    omeprazole (PriLOSEC) 40 MG capsule, Take 1 capsule (40 mg total) by mouth daily, Disp: 30 capsule, Rfl: 6    atorvastatin (LIPITOR) 10 mg tablet, Take 1 tablet (10 mg total) by mouth daily for 30 days, Disp: 30 tablet, Rfl: 6    nortriptyline (PAMELOR) 25 mg capsule, Take 1 capsule (25 mg total) by mouth daily, Disp: 30 capsule, Rfl: 0    No Known Allergies    Physical Exam:    /80 (BP Location: Left arm, Patient Position: Sitting, Cuff Size: Standard)   Pulse 97   Ht 6' 1" (1 854 m)   Wt 91 kg (200 lb 9 6 oz)   BMI 26 47 kg/m²     Constitutional:normal, well developed, well nourished, alert, in no distress and non-toxic and no overt pain behavior    Eyes:anicteric  HEENT:grossly intact  Neck:supple, symmetric, trachea midline and no masses   Pulmonary:even and unlabored  Cardiovascular:No edema or pitting edema present  Skin:Normal without rashes or lesions and well hydrated  Psychiatric:Mood and affect appropriate  Neurologic:Cranial Nerves II-XII grossly intact  Musculoskeletal:normal    Orders Placed This Encounter   Procedures    Ambulatory referral to Neurosurgery

## 2018-10-02 NOTE — LETTER
October 2, 2018     Pedro Paztiffanie   [id number] [facility] [address]    Patient: Regis Garcia   YOB: 1955   Date of Visit: 10/2/2018       Dear Dr Jazmín Degroot: Thank you for referring Regis Garcia to me for evaluation  Below are my notes for this consultation  If you have questions, please do not hesitate to call me  I look forward to following your patient along with you  Sincerely,        Jeanette Muñoz MD        CC: No Recipients  Jeanette Muñoz MD  10/2/2018  9:33 AM  Sign at close encounter  Pain Medicine Follow-Up Note    Assessment:  1  Cervical pain    2  Cervical spinal stenosis    3  Degeneration of intervertebral disc at C5-C6 level    4  Radiculopathy, cervical region        Plan:  Orders Placed This Encounter   Procedures    Ambulatory referral to Neurosurgery     Standing Status:   Future     Standing Expiration Date:   4/2/2019     Referral Priority:   Routine     Referral Type:   Consult - AMB     Referral Reason:   Specialty Services Required     Referred to Provider:   Keysha French MD     Requested Specialty:   Neurosurgery     Number of Visits Requested:   1     Expiration Date:   10/2/2019       New Medications Ordered This Visit   Medications    nortriptyline (PAMELOR) 25 mg capsule     Sig: Take 1 capsule (25 mg total) by mouth daily     Dispense:  30 capsule     Refill:  0     My impressions and treatment recommendations were discussed in detail with the patient who verbalized understanding and had no further questions  The patient is complaining of neck pain and left upper extremity radicular symptoms  He did report about 3 months worth of relief following the previous C6-C7 cervical epidural steroid injection in July 2018  He is amenable to undergoing a repeat injection at this time since he did get good benefit for several months duration    As such, I felt a reasonable to have the patient undergo a C6-C7 cervical epidural steroid injection since this could be potentially therapeutic  The procedures, its risks, and benefits were explained in detail to the patient  Risks include but are not limited to bleeding, infection, hematoma formation, abscess formation, weakness, headache, failure the pain to improve, nerve irritation or damage, and potential worsening of the pain  The patient verbalized understanding and wished to proceed with the procedure  In addition, the patient states that his symptoms are significantly affecting him at work  He would like to know more long-term treatment options  I did state to the patient that if he is having such difficulty at work, I could refer him to a neurosurgeon for possible cervical spine surgery  The patient stated that he would like to meet with a neurosurgeon to discuss his options  As such, I referred him to meet with Dr Johnston President  The patient also reports that gabapentin is not significantly alleviating his pain  He would like to trial a different medicine to help improve his overall pain  As such, I felt a reasonable to trial the patient on nortriptyline 25 mg once daily  Side effects were reviewed with the patient  Follow-up is planned in 4 weeks time or sooner as warranted  Discharge instructions were provided  I personally saw and examined the patient and I agree with the above discussed plan of care  History of Present Illness:    Christie Lofton is a 66 y o  male who presents to [facility] for interval re-evaluation of the above stated pain complaints  The patient has a past medical and chronic pain history as outlined in the assessment section  He was last seen on August 9, 2018 at which time he was maintained on gabapentin 300 mg 3 times daily  Of note, the patient did have C6-C7 cervical epidural steroid injections both in June and July 2018  At today's office visit, the patient's pain score is 8/10 on the verbal numerical pain rating scale    The patient states that his pain is primarily in his neck with radiation to the left upper extremity  He states that his pain is worse in morning, evening, and night  His pain is constant in nature and he reports the quality of his pain as sharp    He states that he had about 2 5-3 months worth of relief following the most recent cervical epidural steroid injection in July 2018  He states that his pain did return and he would like to undergo repeat cervical epidural steroid injection at this time  He also reports that he would like some long-term relief options and states that he is amenable to speaking to a surgeon about his options  Other than as stated above, the patient denies any interval changes in medications, medical condition, mental condition, symptoms, or allergies since the last office visit  Review of Systems:    Review of Systems   Respiratory: Negative for shortness of breath  Cardiovascular: Negative for chest pain  Gastrointestinal: Negative for constipation, diarrhea, nausea and vomiting  Musculoskeletal: Negative for arthralgias, gait problem, joint swelling and myalgias  Skin: Negative for rash  Neurological: Negative for dizziness, seizures and weakness  All other systems reviewed and are negative          Patient Active Problem List   Diagnosis    Allergic rhinitis    Benign essential hypertension    Esophageal reflux    Radiculopathy, cervical region    Cervical spinal stenosis    Degeneration of intervertebral disc at C5-C6 level    Cervical pain       Past Medical History:   Diagnosis Date    Acute pansinusitis     Bicipital tendinitis of left shoulder     GERD (gastroesophageal reflux disease)     Hyperlipidemia     Hypertension     Lateral epicondylitis     Noninfectious dermatoses of eyelid     Pneumonia     Rotator cuff tendinitis     Sciatica     Skin lesion        Past Surgical History:   Procedure Laterality Date    EPIDURAL BLOCK INJECTION N/A 6/13/2018    Procedure: C6 C7 Cervical Epidural Steroid Injection;  Surgeon: Hortencia Ho MD;  Location: [facility] MAIN OR;  Service: Pain Management     EPIDURAL BLOCK INJECTION N/A 7/13/2018    Procedure: C6 C7 Cervical Epidural Steroid Injection (42202); Surgeon: Hortencia Ho MD;  Location: [facility] MAIN OR;  Service: Pain Management        Family History   Problem Relation Age of Onset    No Known Problems Mother     No Known Problems Father        Social History     Occupational History    Not on file  Social History Main Topics    Smoking status: Former Smoker    Smokeless tobacco: Never Used    Alcohol use Not on file    Drug use: Unknown    Sexual activity: Not on file         Current Outpatient Prescriptions:     ascorbic acid (VITAMIN C) 500 mg tablet, Take 500 mg by mouth daily, Disp: , Rfl:     aspirin (ECOTRIN LOW STRENGTH) 81 mg EC tablet, Take 81 mg by mouth daily, Disp: , Rfl:     cyanocobalamin (VITAMIN B-12) 1,000 mcg tablet, Take by mouth daily, Disp: , Rfl:     lisinopril-hydrochlorothiazide (PRINZIDE,ZESTORETIC) 20-12 5 MG per tablet, Take 1 tablet by mouth daily, Disp: 30 tablet, Rfl: 6    omeprazole (PriLOSEC) 40 MG capsule, Take 1 capsule (40 mg total) by mouth daily, Disp: 30 capsule, Rfl: 6    atorvastatin (LIPITOR) 10 mg tablet, Take 1 tablet (10 mg total) by mouth daily for 30 days, Disp: 30 tablet, Rfl: 6    nortriptyline (PAMELOR) 25 mg capsule, Take 1 capsule (25 mg total) by mouth daily, Disp: 30 capsule, Rfl: 0    No Known Allergies    Physical Exam:    /80 (BP Location: Left arm, Patient Position: Sitting, Cuff Size: Standard)   Pulse 97   Ht 6' 1" (1 854 m)   Wt 91 kg (200 lb 9 6 oz)   BMI 26 47 kg/m²      Constitutional:normal, well developed, well nourished, alert, in no distress and non-toxic and no overt pain behavior    Eyes:anicteric  HEENT:grossly intact  Neck:supple, symmetric, trachea midline and no masses   Pulmonary:even and unlabored  Cardiovascular:No edema or pitting edema present  Skin:Normal without rashes or lesions and well hydrated  Psychiatric:Mood and affect appropriate  Neurologic:Cranial Nerves II-XII grossly intact  Musculoskeletal:normal    Orders Placed This Encounter   Procedures    Ambulatory referral to Neurosurgery

## 2018-10-03 ENCOUNTER — TELEPHONE (OUTPATIENT)
Dept: PAIN MEDICINE | Facility: CLINIC | Age: 63
End: 2018-10-03

## 2018-10-03 NOTE — TELEPHONE ENCOUNTER
lvm for pt to call and schedule C6 C7 LARRY #3   Please transfer to [facility]   [phone]

## 2018-10-04 PROBLEM — M50.322 DEGENERATION OF C5-C6 INTERVERTEBRAL DISC: Status: ACTIVE | Noted: 2018-10-04

## 2018-10-04 PROBLEM — M48.02 SPINAL STENOSIS IN CERVICAL REGION: Status: ACTIVE | Noted: 2018-10-04

## 2018-10-04 PROBLEM — M54.12 CERVICAL RADICULITIS: Status: ACTIVE | Noted: 2018-10-04

## 2018-10-04 PROBLEM — G89.4 CHRONIC PAIN SYNDROME: Status: ACTIVE | Noted: 2018-10-04

## 2018-10-04 PROBLEM — M54.2 CERVICALGIA: Status: ACTIVE | Noted: 2018-10-04

## 2018-10-04 NOTE — TELEPHONE ENCOUNTER
Scheduled pt for C6 C7 LARRY for 10/12/18  Went over pre-procedure instructions below:    Pt is currently taking self prescribed aspirin and was instructed to hold for 6 days with last dose on 10/5/18  Also pt takes Ibuprofen occasionally and was instructed last does should be 10/10/18    Nothing to eat or drink 1 hr prior to procedure  Need to arrange transportation  Proper clothing for procedure  If ill or placed on antibiotics please call to reschedule

## 2018-10-12 ENCOUNTER — HOSPITAL ENCOUNTER (OUTPATIENT)
Facility: AMBULARY SURGERY CENTER | Age: 63
Setting detail: OUTPATIENT SURGERY
Discharge: HOME | End: 2018-10-12
Attending: ANESTHESIOLOGY | Admitting: ANESTHESIOLOGY
Payer: COMMERCIAL

## 2018-10-12 ENCOUNTER — HOSPITAL ENCOUNTER (OUTPATIENT)
Dept: RADIOLOGY | Facility: HOSPITAL | Age: 63
Setting detail: OUTPATIENT SURGERY
Discharge: HOME | End: 2018-10-12
Payer: COMMERCIAL

## 2018-10-12 VITALS
DIASTOLIC BLOOD PRESSURE: 84 MMHG | TEMPERATURE: 96.8 F | BODY MASS INDEX: 26.64 KG/M2 | WEIGHT: 201 LBS | HEART RATE: 90 BPM | RESPIRATION RATE: 18 BRPM | OXYGEN SATURATION: 98 % | SYSTOLIC BLOOD PRESSURE: 160 MMHG | HEIGHT: 73 IN

## 2018-10-12 PROCEDURE — 62321 NJX INTERLAMINAR CRV/THRC: CPT | Performed by: ANESTHESIOLOGY

## 2018-10-12 PROCEDURE — 72020 X-RAY EXAM OF SPINE 1 VIEW: CPT

## 2018-10-12 RX ORDER — LIDOCAINE WITH 8.4% SOD BICARB 0.9%(10ML)
SYRINGE (ML) INJECTION AS NEEDED
Status: DISCONTINUED | OUTPATIENT
Start: 2018-10-12 | End: 2018-10-12 | Stop reason: HOSPADM

## 2018-10-12 RX ORDER — METHYLPREDNISOLONE ACETATE 40 MG/ML
INJECTION, SUSPENSION INTRA-ARTICULAR; INTRALESIONAL; INTRAMUSCULAR; SOFT TISSUE AS NEEDED
Status: DISCONTINUED | OUTPATIENT
Start: 2018-10-12 | End: 2018-10-12 | Stop reason: HOSPADM

## 2018-10-12 RX ORDER — SODIUM CHLORIDE 9 MG/ML
INJECTION, SOLUTION INTRAVENOUS
Status: COMPLETED | OUTPATIENT
Start: 2018-10-12 | End: 2018-10-12

## 2018-10-12 NOTE — OP NOTE
ATTENDING PHYSICIAN:  Naman Sanches MD     PROCEDURE:  Cervical epidural steroid injection with steroid and local anesthetic under fluoroscopy at the C6-C7 level  PREPROCEDURE DIAGNOSIS:  Neck pain and upper extremity radicular symptoms  POSTPROCEDURE DIAGNOSIS:  Neck pain and upper extremity radicular symptoms  ANESTHESIA:  Local     ESTIMATED BLOOD LOSS:  Minimal     COMPLICATIONS:  None  LOCATION:  [address]  CONSENT:  Today's procedure, its potential benefits as well as its risks and potential side effects were reviewed  Discussed risks of the procedure including bleeding, infection, nerve irritation or damage, reactions to the medications, headache, failure of the pain to improve, and potential worsening of the pain were explained to the patient who verbalized understanding and who wished to proceed  Written informed consent is thereby obtained  DESCRIPTION OF THE PROCEDURE:  After written informed consent was obtained, the patient was taken to the fluoroscopy suite and placed in the prone position  Anatomical landmarks were identified by way of fluoroscopy in multiple views  The skin of the cervical region was prepped and draped in the usual sterile fashion  Strict aseptic technique was utilized  The skin and subcutaneous tissues at the needle entry site were infiltrated with 3 mL of 1% preservative-free lidocaine using a 25-gauge 1-1/2-inch needle  A 20-gauge Tuohy needle was then incrementally advanced under fluoroscopy using a loss of resistance technique  Upon entering into the epidural space, a positive loss of resistance to air was noted and a characteristic "pop" was felt  Proper placement into the epidural space was confirmed with a hanging column technique where preservative-free normal saline was noted to flow freely to gravity in to the epidural space as well as by the administration of contrast to delineate the epidural space   There were no paresthesias reported  After negative aspiration for CSF or heme, a 6 mL injectate consisting of 1 mL of Depo-Medrol 80 mg/mL and 1 mL of Depo-Medrol 40 mg/mL mixed with 4 mL of preservative-free normal saline was slowly injected  The patient tolerated the procedure well and all needles were removed with the tips intact  Hemostasis was maintained  There were no apparent paresthesias or complications  The skin was wiped clean and a Band-Aid was placed as appropriate  The patient was monitored for an appropriate period of time following the procedure and remained hemodynamically stable and neurovascularly intact following the procedure  The patient was ultimately discharged to home with supervision in good condition and instructed to call the office in a few days for an update or sooner as warranted  I was present for and participated in all key and critical portions of this procedure      Marija Santos MD  10/12/2018  7:43 AM

## 2018-10-12 NOTE — DISCHARGE INSTRUCTIONS
Epidural Steroid Injection   WHAT YOU NEED TO KNOW:   An epidural steroid injection (JOEL) is a procedure to inject steroid medicine into the epidural space  The epidural space is between your spinal cord and vertebrae  Steroids reduce inflammation and fluid buildup in your spine that may be causing pain  You may be given pain medicine along with the steroids  ACTIVITY  · Do not drive or operate machinery today  · No strenuous activity today - bending, lifting, etc   · You may resume normal activites starting tomorrow - start slowly and as tolerated  · You may shower today, but no tub baths or hot tubs  · You may have numbness for several hours from the local anesthetic  Please use caution and common sense, especially with weight-bearing activities  CARE OF THE INJECTION SITE  · If you have soreness or pain, apply ice to the area today (20 minutes on/20 minutes off)  · Starting tomorrow, you may use warm, moist heat or ice if needed  · You may have an increase or change in your discomfort for 36-48 hours after your treatment  · Apply ice and continue with any pain medication you have been prescribed  · [facility]ify [facility] if you have any of the following: redness, drainage, swelling, headache, stiff neck or fever above 100°F     SPECIAL INSTRUCTIONS  · Our office will contact you in approximately 7 days for a progress report  MEDICATIONS  · Continue to take all routine medications  · Our office may have instructed you to hold some medications  If you have a problem specifically related to your procedure, please call our office at [phone]  Problems not related to your procedure should be directed to your primary care physician

## 2018-10-12 NOTE — H&P (VIEW-ONLY)
Pain Medicine Follow-Up Note    Assessment:  1  Cervical pain    2  Cervical spinal stenosis    3  Degeneration of intervertebral disc at C5-C6 level    4  Radiculopathy, cervical region        Plan:  Orders Placed This Encounter   Procedures    Ambulatory referral to Neurosurgery     Standing Status:   Future     Standing Expiration Date:   4/2/2019     Referral Priority:   Routine     Referral Type:   Consult - AMB     Referral Reason:   Specialty Services Required     Referred to Provider:   Lucius Hutchins MD     Requested Specialty:   Neurosurgery     Number of Visits Requested:   1     Expiration Date:   10/2/2019       New Medications Ordered This Visit   Medications    nortriptyline (PAMELOR) 25 mg capsule     Sig: Take 1 capsule (25 mg total) by mouth daily     Dispense:  30 capsule     Refill:  0     My impressions and treatment recommendations were discussed in detail with the patient who verbalized understanding and had no further questions  The patient is complaining of neck pain and left upper extremity radicular symptoms  He did report about 3 months worth of relief following the previous C6-C7 cervical epidural steroid injection in July 2018  He is amenable to undergoing a repeat injection at this time since he did get good benefit for several months duration  As such, I felt a reasonable to have the patient undergo a C6-C7 cervical epidural steroid injection since this could be potentially therapeutic  The procedures, its risks, and benefits were explained in detail to the patient  Risks include but are not limited to bleeding, infection, hematoma formation, abscess formation, weakness, headache, failure the pain to improve, nerve irritation or damage, and potential worsening of the pain  The patient verbalized understanding and wished to proceed with the procedure  In addition, the patient states that his symptoms are significantly affecting him at work    He would like to know more long-term treatment options  I did state to the patient that if he is having such difficulty at work, I could refer him to a neurosurgeon for possible cervical spine surgery  The patient stated that he would like to meet with a neurosurgeon to discuss his options  As such, I referred him to meet with Dr Ag Fallon  The patient also reports that gabapentin is not significantly alleviating his pain  He would like to trial a different medicine to help improve his overall pain  As such, I felt a reasonable to trial the patient on nortriptyline 25 mg once daily  Side effects were reviewed with the patient  Follow-up is planned in 4 weeks time or sooner as warranted  Discharge instructions were provided  I personally saw and examined the patient and I agree with the above discussed plan of care  History of Present Illness:    Fabian Mcelroy is a 66 y o  male who presents to [facility] for interval re-evaluation of the above stated pain complaints  The patient has a past medical and chronic pain history as outlined in the assessment section  He was last seen on August 9, 2018 at which time he was maintained on gabapentin 300 mg 3 times daily  Of note, the patient did have C6-C7 cervical epidural steroid injections both in June and July 2018  At today's office visit, the patient's pain score is 8/10 on the verbal numerical pain rating scale  The patient states that his pain is primarily in his neck with radiation to the left upper extremity  He states that his pain is worse in morning, evening, and night  His pain is constant in nature and he reports the quality of his pain as sharp    He states that he had about 2 5-3 months worth of relief following the most recent cervical epidural steroid injection in July 2018  He states that his pain did return and he would like to undergo repeat cervical epidural steroid injection at this time    He also reports that he would like some long-term relief options and states that he is amenable to speaking to a surgeon about his options  Other than as stated above, the patient denies any interval changes in medications, medical condition, mental condition, symptoms, or allergies since the last office visit  Review of Systems:    Review of Systems   Respiratory: Negative for shortness of breath  Cardiovascular: Negative for chest pain  Gastrointestinal: Negative for constipation, diarrhea, nausea and vomiting  Musculoskeletal: Negative for arthralgias, gait problem, joint swelling and myalgias  Skin: Negative for rash  Neurological: Negative for dizziness, seizures and weakness  All other systems reviewed and are negative  Patient Active Problem List   Diagnosis    Allergic rhinitis    Benign essential hypertension    Esophageal reflux    Radiculopathy, cervical region    Cervical spinal stenosis    Degeneration of intervertebral disc at C5-C6 level    Cervical pain       Past Medical History:   Diagnosis Date    Acute pansinusitis     Bicipital tendinitis of left shoulder     GERD (gastroesophageal reflux disease)     Hyperlipidemia     Hypertension     Lateral epicondylitis     Noninfectious dermatoses of eyelid     Pneumonia     Rotator cuff tendinitis     Sciatica     Skin lesion        Past Surgical History:   Procedure Laterality Date    EPIDURAL BLOCK INJECTION N/A 6/13/2018    Procedure: C6 C7 Cervical Epidural Steroid Injection;  Surgeon: Love Benavidez MD;  Location: [place] [address] MAIN OR;  Service: Pain Management     EPIDURAL BLOCK INJECTION N/A 7/13/2018    Procedure: C6 C7 Cervical Epidural Steroid Injection (64425); Surgeon: Love Benavidez MD;  Location: [facility] MAIN OR;  Service: Pain Management        Family History   Problem Relation Age of Onset    No Known Problems Mother     No Known Problems Father        Social History     Occupational History    Not on file       Social History Main Topics    Smoking status: Former Smoker    Smokeless tobacco: Never Used    Alcohol use Not on file    Drug use: Unknown    Sexual activity: Not on file         Current Outpatient Prescriptions:     ascorbic acid (VITAMIN C) 500 mg tablet, Take 500 mg by mouth daily, Disp: , Rfl:     aspirin (ECOTRIN LOW STRENGTH) 81 mg EC tablet, Take 81 mg by mouth daily, Disp: , Rfl:     cyanocobalamin (VITAMIN B-12) 1,000 mcg tablet, Take by mouth daily, Disp: , Rfl:     lisinopril-hydrochlorothiazide (PRINZIDE,ZESTORETIC) 20-12 5 MG per tablet, Take 1 tablet by mouth daily, Disp: 30 tablet, Rfl: 6    omeprazole (PriLOSEC) 40 MG capsule, Take 1 capsule (40 mg total) by mouth daily, Disp: 30 capsule, Rfl: 6    atorvastatin (LIPITOR) 10 mg tablet, Take 1 tablet (10 mg total) by mouth daily for 30 days, Disp: 30 tablet, Rfl: 6    nortriptyline (PAMELOR) 25 mg capsule, Take 1 capsule (25 mg total) by mouth daily, Disp: 30 capsule, Rfl: 0    No Known Allergies    Physical Exam:    /80 (BP Location: Left arm, Patient Position: Sitting, Cuff Size: Standard)   Pulse 97   Ht 6' 1" (1 854 m)   Wt 91 kg (200 lb 9 6 oz)   BMI 26 47 kg/m²     Constitutional:normal, well developed, well nourished, alert, in no distress and non-toxic and no overt pain behavior    Eyes:anicteric  HEENT:grossly intact  Neck:supple, symmetric, trachea midline and no masses   Pulmonary:even and unlabored  Cardiovascular:No edema or pitting edema present  Skin:Normal without rashes or lesions and well hydrated  Psychiatric:Mood and affect appropriate  Neurologic:Cranial Nerves II-XII grossly intact  Musculoskeletal:normal    Orders Placed This Encounter   Procedures    Ambulatory referral to Neurosurgery

## 2018-10-19 ENCOUNTER — TELEPHONE (OUTPATIENT)
Dept: PAIN MEDICINE | Facility: CLINIC | Age: 63
End: 2018-10-19

## 2018-10-19 NOTE — TELEPHONE ENCOUNTER
S/p C6 C7 Cervical Epidural Steroid Injection 10/12/18 by Dr Ravinder Ackerman follow up on 11/6/18 @ 8:30am     Left msg with wife to have him call the office back

## 2018-10-25 NOTE — TELEPHONE ENCOUNTER
Spoke with pt's wife she states that he has tried to call and could not get through     She states that pt is fine and that he has 0 pain and 100%relief

## 2018-11-06 ENCOUNTER — OFFICE VISIT (OUTPATIENT)
Dept: PAIN MEDICINE | Facility: CLINIC | Age: 63
End: 2018-11-06
Payer: COMMERCIAL

## 2018-11-06 VITALS
SYSTOLIC BLOOD PRESSURE: 124 MMHG | TEMPERATURE: 98.6 F | DIASTOLIC BLOOD PRESSURE: 72 MMHG | HEART RATE: 91 BPM | WEIGHT: 201 LBS | BODY MASS INDEX: 26.64 KG/M2 | HEIGHT: 73 IN

## 2018-11-06 DIAGNOSIS — M48.02 CERVICAL SPINAL STENOSIS: ICD-10-CM

## 2018-11-06 DIAGNOSIS — G89.4 CHRONIC PAIN SYNDROME: Primary | ICD-10-CM

## 2018-11-06 DIAGNOSIS — M50.322 DEGENERATION OF C5-C6 INTERVERTEBRAL DISC: ICD-10-CM

## 2018-11-06 DIAGNOSIS — M54.2 CERVICALGIA: ICD-10-CM

## 2018-11-06 DIAGNOSIS — M54.12 RADICULOPATHY, CERVICAL REGION: ICD-10-CM

## 2018-11-06 DIAGNOSIS — R20.2 LEFT HAND PARESTHESIA: ICD-10-CM

## 2018-11-06 PROCEDURE — 99213 OFFICE O/P EST LOW 20 MIN: CPT | Performed by: ANESTHESIOLOGY

## 2018-11-06 NOTE — LETTER
November 6, 2018     Graciela William DO  [address] [facility]    Patient: Simon Salcedo   YOB: 1955   Date of Visit: 11/6/2018       Dear Dr Cl Garsia: Thank you for referring Simon Salcedo to me for evaluation  Below are my notes for this consultation  If you have questions, please do not hesitate to call me  I look forward to following your patient along with you  Sincerely,        Deangelo Day MD        CC: No Recipients  Deangelo Day MD  11/6/2018  9:18 AM  Sign at close encounter  Assessment:  1  Chronic pain syndrome    2  Cervicalgia    3  Cervical spinal stenosis    4  Degeneration of C5-C6 intervertebral disc    5  Radiculopathy, cervical region    6  Left hand paresthesia        Plan:  My impressions and treatment recommendations were discussed in detail with the patient who verbalized understanding and had no further questions  The patient has been complaining of left hand paresthesias since undergoing the cervical epidural steroid injection recently  He reports that his neck pain and left arm pain has improved, but he still complains of paresthesias involving the left hand as well as the second, third, and fourth digits  I do not think that these paresthesias are related to the cervical spine pathology in his neck since the dermatomal distribution of these would involve the C6 and C7 distributions  The patient does not appear to have pathology at these levels  As such, I asked the patient to follow up with Dr Luisa Duke regarding any possible hand pathology that may be contributing to his pain complaints  I did discuss with the patient that should he require any interventional pain procedures in the future, I would be happy to provide them for him  He reports 100% relief of symptoms in his neck and left arm at this time  Discharge instructions were provided   I personally saw and examined the patient and I agree with the above discussed plan of care  Orders Placed This Encounter   Procedures    Ambulatory referral to Orthopedic Surgery     Standing Status:   Future     Standing Expiration Date:   5/6/2019     Referral Priority:   Routine     Referral Type:   Consult - AMB     Referral Reason:   Specialty Services Required     Referred to Provider:   Carlo Salvador DO     Requested Specialty:   Orthopedic Surgery     Number of Visits Requested:   1     Expiration Date:   11/6/2019     No orders of the defined types were placed in this encounter  History of Present Illness:  Omero Child is a 66 y o  male who presents for a follow up office visit in regards to Hand Pain  The patients current symptoms include left hand and left second, third, and fourth digit pain  He underwent a cervical epidural steroid injection recently  At today's office visit, the patient's pain score is 0/10 on the verbal numerical pain rating scale  He states that his pain is worse at night and constant in nature  He reports the quality of his pain as in pins and needles    He describes pain primarily in his left hand and second, third, and fourth digits  He states that his symptoms are annoying      I have personally reviewed and/or updated the patient's past medical history, past surgical history, family history, social history, current medications, allergies, and vital signs today  Review of Systems   Respiratory: Negative for shortness of breath  Cardiovascular: Negative for chest pain  Gastrointestinal: Negative for constipation, diarrhea, nausea and vomiting  Musculoskeletal: Negative for arthralgias, gait problem, joint swelling and myalgias  Skin: Negative for rash  Neurological: Negative for dizziness, seizures and weakness  All other systems reviewed and are negative        Patient Active Problem List   Diagnosis    Allergic rhinitis    Benign essential hypertension    Esophageal reflux    Radiculopathy, cervical region    Cervical spinal stenosis    Degeneration of C5-C6 intervertebral disc    Cervicalgia    Chronic pain syndrome       Past Medical History:   Diagnosis Date    Acute pansinusitis     Bicipital tendinitis of left shoulder     GERD (gastroesophageal reflux disease)     Hyperlipidemia     Hypertension     Lateral epicondylitis     Noninfectious dermatoses of eyelid     Pneumonia     Rotator cuff tendinitis     Sciatica     Skin lesion        Past Surgical History:   Procedure Laterality Date    EPIDURAL BLOCK INJECTION N/A 6/13/2018    Procedure: C6 C7 Cervical Epidural Steroid Injection;  Surgeon: Jersey Parnell MD;  Location: [place] [address] MAIN OR;  Service: Pain Management     EPIDURAL BLOCK INJECTION N/A 7/13/2018    Procedure: C6 C7 Cervical Epidural Steroid Injection (46885); Surgeon: Jersey Parnell MD;  Location: [facility] MAIN OR;  Service: Pain Management     EPIDURAL BLOCK INJECTION N/A 10/12/2018    Procedure: C6 C7 Cervical Epidural Steroid Injection (09822); Surgeon: Jersey Parnell MD;  Location: [facility] MAIN OR;  Service: Pain Management        Family History   Problem Relation Age of Onset    No Known Problems Mother     No Known Problems Father        Social History     Occupational History    Not on file       Social History Main Topics    Smoking status: Former Smoker    Smokeless tobacco: Never Used    Alcohol use Not on file    Drug use: Unknown    Sexual activity: Not on file       Current Outpatient Prescriptions on File Prior to Visit   Medication Sig    ascorbic acid (VITAMIN C) 500 mg tablet Take 500 mg by mouth daily    aspirin (ECOTRIN LOW STRENGTH) 81 mg EC tablet Take 81 mg by mouth daily    atorvastatin (LIPITOR) 10 mg tablet Take 1 tablet (10 mg total) by mouth daily for 30 days    cyanocobalamin (VITAMIN B-12) 1,000 mcg tablet Take by mouth daily    lisinopril-hydrochlorothiazide (PRINZIDE,ZESTORETIC) 20-12 5 MG per tablet Take 1 tablet by mouth daily    nortriptyline (PAMELOR) 25 mg capsule Take 1 capsule (25 mg total) by mouth daily    omeprazole (PriLOSEC) 40 MG capsule Take 1 capsule (40 mg total) by mouth daily     No current facility-administered medications on file prior to visit  No Known Allergies    Physical Exam:    /72   Pulse 91   Temp 98 6 °F (37 °C) (Oral)   Ht 6' 1" (1 854 m)   Wt 91 2 kg (201 lb)   BMI 26 52 kg/m²      Constitutional:normal, well developed, well nourished, alert, in no distress and non-toxic and no overt pain behavior  Eyes:anicteric  HEENT:grossly intact  Neck:supple, symmetric, trachea midline and no masses   Pulmonary:even and unlabored  Cardiovascular:No edema or pitting edema present  Skin:Normal without rashes or lesions and well hydrated  Psychiatric:Mood and affect appropriate  Neurologic:Cranial Nerves II-XII grossly intact  Musculoskeletal:normal, Tinel's sign is negative on the left

## 2018-11-06 NOTE — PROGRESS NOTES
Assessment:  1  Chronic pain syndrome    2  Cervicalgia    3  Cervical spinal stenosis    4  Degeneration of C5-C6 intervertebral disc    5  Radiculopathy, cervical region    6  Left hand paresthesia        Plan:  My impressions and treatment recommendations were discussed in detail with the patient who verbalized understanding and had no further questions  The patient has been complaining of left hand paresthesias since undergoing the cervical epidural steroid injection recently  He reports that his neck pain and left arm pain has improved, but he still complains of paresthesias involving the left hand as well as the second, third, and fourth digits  I do not think that these paresthesias are related to the cervical spine pathology in his neck since the dermatomal distribution of these would involve the C6 and C7 distributions  The patient does not appear to have pathology at these levels  As such, I asked the patient to follow up with Dr Marlon Argueta regarding any possible hand pathology that may be contributing to his pain complaints  I did discuss with the patient that should he require any interventional pain procedures in the future, I would be happy to provide them for him  He reports 100% relief of symptoms in his neck and left arm at this time  Discharge instructions were provided  I personally saw and examined the patient and I agree with the above discussed plan of care  Orders Placed This Encounter   Procedures    Ambulatory referral to Orthopedic Surgery     Standing Status:   Future     Standing Expiration Date:   5/6/2019     Referral Priority:   Routine     Referral Type:   Consult - AMB     Referral Reason:   Specialty Services Required     Referred to Provider:   Marlon Argueta DO     Requested Specialty:   Orthopedic Surgery     Number of Visits Requested:   1     Expiration Date:   11/6/2019     No orders of the defined types were placed in this encounter        History of Present Illness:  Cristopher Schuster is a 66 y o  male who presents for a follow up office visit in regards to Hand Pain  The patients current symptoms include left hand and left second, third, and fourth digit pain  He underwent a cervical epidural steroid injection recently  At today's office visit, the patient's pain score is 0/10 on the verbal numerical pain rating scale  He states that his pain is worse at night and constant in nature  He reports the quality of his pain as in pins and needles    He describes pain primarily in his left hand and second, third, and fourth digits  He states that his symptoms are annoying      I have personally reviewed and/or updated the patient's past medical history, past surgical history, family history, social history, current medications, allergies, and vital signs today  Review of Systems   Respiratory: Negative for shortness of breath  Cardiovascular: Negative for chest pain  Gastrointestinal: Negative for constipation, diarrhea, nausea and vomiting  Musculoskeletal: Negative for arthralgias, gait problem, joint swelling and myalgias  Skin: Negative for rash  Neurological: Negative for dizziness, seizures and weakness  All other systems reviewed and are negative        Patient Active Problem List   Diagnosis    Allergic rhinitis    Benign essential hypertension    Esophageal reflux    Radiculopathy, cervical region    Cervical spinal stenosis    Degeneration of C5-C6 intervertebral disc    Cervicalgia    Chronic pain syndrome       Past Medical History:   Diagnosis Date    Acute pansinusitis     Bicipital tendinitis of left shoulder     GERD (gastroesophageal reflux disease)     Hyperlipidemia     Hypertension     Lateral epicondylitis     Noninfectious dermatoses of eyelid     Pneumonia     Rotator cuff tendinitis     Sciatica     Skin lesion        Past Surgical History:   Procedure Laterality Date    EPIDURAL BLOCK INJECTION N/A 6/13/2018    Procedure: C6 C7 Cervical Epidural Steroid Injection;  Surgeon: Teena Hooker MD;  Location: [facility] MAIN OR;  Service: Pain Management     EPIDURAL BLOCK INJECTION N/A 7/13/2018    Procedure: C6 C7 Cervical Epidural Steroid Injection (23505); Surgeon: Teena Hooker MD;  Location: [facility] MAIN OR;  Service: Pain Management     EPIDURAL BLOCK INJECTION N/A 10/12/2018    Procedure: C6 C7 Cervical Epidural Steroid Injection (32846); Surgeon: Teena Hooker MD;  Location: [facility] MAIN OR;  Service: Pain Management        Family History   Problem Relation Age of Onset    No Known Problems Mother     No Known Problems Father        Social History     Occupational History    Not on file  Social History Main Topics    Smoking status: Former Smoker    Smokeless tobacco: Never Used    Alcohol use Not on file    Drug use: Unknown    Sexual activity: Not on file       Current Outpatient Prescriptions on File Prior to Visit   Medication Sig    ascorbic acid (VITAMIN C) 500 mg tablet Take 500 mg by mouth daily    aspirin (ECOTRIN LOW STRENGTH) 81 mg EC tablet Take 81 mg by mouth daily    atorvastatin (LIPITOR) 10 mg tablet Take 1 tablet (10 mg total) by mouth daily for 30 days    cyanocobalamin (VITAMIN B-12) 1,000 mcg tablet Take by mouth daily    lisinopril-hydrochlorothiazide (PRINZIDE,ZESTORETIC) 20-12 5 MG per tablet Take 1 tablet by mouth daily    nortriptyline (PAMELOR) 25 mg capsule Take 1 capsule (25 mg total) by mouth daily    omeprazole (PriLOSEC) 40 MG capsule Take 1 capsule (40 mg total) by mouth daily     No current facility-administered medications on file prior to visit          No Known Allergies    Physical Exam:    /72   Pulse 91   Temp 98 6 °F (37 °C) (Oral)   Ht 6' 1" (1 854 m)   Wt 91 2 kg (201 lb)   BMI 26 52 kg/m²     Constitutional:normal, well developed, well nourished, alert, in no distress and non-toxic and no overt pain behavior  Eyes:anicteric  HEENT:grossly intact  Neck:supple, symmetric, trachea midline and no masses   Pulmonary:even and unlabored  Cardiovascular:No edema or pitting edema present  Skin:Normal without rashes or lesions and well hydrated  Psychiatric:Mood and affect appropriate  Neurologic:Cranial Nerves II-XII grossly intact  Musculoskeletal:normal, Tinel's sign is negative on the left

## 2018-12-13 DIAGNOSIS — E78.5 DYSLIPIDEMIA: ICD-10-CM

## 2018-12-13 RX ORDER — ATORVASTATIN CALCIUM 10 MG/1
TABLET, FILM COATED ORAL
Qty: 30 TABLET | Refills: 5 | Status: SHIPPED | OUTPATIENT
Start: 2018-12-13 | End: 2019-09-03 | Stop reason: SDUPTHER

## 2018-12-18 ENCOUNTER — OFFICE VISIT (OUTPATIENT)
Dept: FAMILY MEDICINE CLINIC | Facility: CLINIC | Age: 63
End: 2018-12-18
Payer: COMMERCIAL

## 2018-12-18 VITALS
BODY MASS INDEX: 26.52 KG/M2 | SYSTOLIC BLOOD PRESSURE: 130 MMHG | DIASTOLIC BLOOD PRESSURE: 80 MMHG | WEIGHT: 201 LBS | TEMPERATURE: 97 F | RESPIRATION RATE: 12 BRPM | HEART RATE: 78 BPM

## 2018-12-18 DIAGNOSIS — J06.9 ACUTE URI: Primary | ICD-10-CM

## 2018-12-18 PROCEDURE — 99213 OFFICE O/P EST LOW 20 MIN: CPT | Performed by: NURSE PRACTITIONER

## 2018-12-18 PROCEDURE — 1036F TOBACCO NON-USER: CPT | Performed by: NURSE PRACTITIONER

## 2018-12-18 RX ORDER — AZITHROMYCIN 250 MG/1
TABLET, FILM COATED ORAL
Qty: 6 TABLET | Refills: 0 | Status: SHIPPED | OUTPATIENT
Start: 2018-12-18 | End: 2018-12-22

## 2018-12-18 NOTE — PATIENT INSTRUCTIONS

## 2018-12-18 NOTE — PROGRESS NOTES
Assessment:      viral upper respiratory illness      Plan:      Discussed diagnosis and treatment of URI  Discussed the importance of avoiding unnecessary antibiotic therapy  Suggested symptomatic OTC remedies  Nasal saline spray for congestion  Follow up as needed  Subjective:       History was provided by the patient  Frederick Tate is a 66 y o  male who presents for evaluation of symptoms of a URI  Symptoms include dry cough, post nasal drip, sinus and nasal congestion and sore throat  Onset of symptoms was 3 days ago, unchanged since that time  Denies lightheadedness, low grade fever and purulent nasal discharge  He is drinking plenty of fluids  Evaluation to date: none  Treatment to date: none  Denies sick contacts  The following portions of the patient's history were reviewed and updated as appropriate: allergies, current medications, past family history, past medical history, past social history, past surgical history and problem list     Review of Systems  Pertinent items are noted in HPI        Objective:      /80 (BP Location: Left arm, Patient Position: Sitting, Cuff Size: Adult)   Pulse 78   Temp (!) 97 °F (36 1 °C) (Temporal)   Resp 12   Wt 91 2 kg (201 lb)   BMI 26 52 kg/m²   General appearance: alert and oriented, in no acute distress  Head: Normocephalic, without obvious abnormality, sinuses nontender to percussion  Ears: normal TM's and external ear canals both ears  Nose: no discharge, turbinates red  Throat: abnormal findings: moderate oropharyngeal erythema  Lungs: clear to auscultation bilaterally  Heart: regular rate and rhythm, S1, S2 normal, no murmur, click, rub or gallop  Pulses: 2+ and symmetric  Lymph nodes: Cervical adenopathy: none  Neurologic: Grossly normal         Answers for HPI/ROS submitted by the patient on 12/18/2018   Sore throat  Chronicity: new  Onset: yesterday  Progression since onset: gradually worsening  Pain worse on: neither  Fever: no fever  Pain - numeric: 8/10  abdominal pain: No  congestion: Yes  cough: Yes  diarrhea: No  drooling: No  ear discharge: No  ear pain: No  headaches: Yes  hoarse voice: No  neck pain: No  plugged ear sensation: No  shortness of breath: No  stridor: No  swollen glands: No  trouble swallowing: No  vomiting: No  strep: No  mono:  No

## 2019-02-13 DIAGNOSIS — I10 BENIGN ESSENTIAL HYPERTENSION: ICD-10-CM

## 2019-02-13 RX ORDER — LISINOPRIL AND HYDROCHLOROTHIAZIDE 20; 12.5 MG/1; MG/1
1 TABLET ORAL DAILY
Qty: 30 TABLET | Refills: 6 | Status: SHIPPED | OUTPATIENT
Start: 2019-02-13 | End: 2019-10-11 | Stop reason: SDUPTHER

## 2019-05-21 ENCOUNTER — OFFICE VISIT (OUTPATIENT)
Dept: PAIN MEDICINE | Facility: CLINIC | Age: 64
End: 2019-05-21
Payer: COMMERCIAL

## 2019-05-21 VITALS
HEIGHT: 73 IN | BODY MASS INDEX: 27.28 KG/M2 | WEIGHT: 205.8 LBS | RESPIRATION RATE: 18 BRPM | DIASTOLIC BLOOD PRESSURE: 78 MMHG | SYSTOLIC BLOOD PRESSURE: 148 MMHG | HEART RATE: 98 BPM

## 2019-05-21 DIAGNOSIS — M54.2 CERVICALGIA: ICD-10-CM

## 2019-05-21 DIAGNOSIS — M54.12 RADICULOPATHY, CERVICAL REGION: ICD-10-CM

## 2019-05-21 DIAGNOSIS — M48.02 CERVICAL SPINAL STENOSIS: ICD-10-CM

## 2019-05-21 DIAGNOSIS — G89.4 CHRONIC PAIN SYNDROME: Primary | ICD-10-CM

## 2019-05-21 DIAGNOSIS — M50.322 DEGENERATION OF C5-C6 INTERVERTEBRAL DISC: ICD-10-CM

## 2019-05-21 PROCEDURE — 99214 OFFICE O/P EST MOD 30 MIN: CPT | Performed by: ANESTHESIOLOGY

## 2019-05-31 ENCOUNTER — HOSPITAL ENCOUNTER (OUTPATIENT)
Facility: AMBULARY SURGERY CENTER | Age: 64
Setting detail: OUTPATIENT SURGERY
Discharge: HOME | End: 2019-05-31
Attending: ANESTHESIOLOGY | Admitting: ANESTHESIOLOGY
Payer: COMMERCIAL

## 2019-05-31 ENCOUNTER — APPOINTMENT (OUTPATIENT)
Dept: RADIOLOGY | Facility: HOSPITAL | Age: 64
End: 2019-05-31
Payer: COMMERCIAL

## 2019-05-31 VITALS
WEIGHT: 205 LBS | HEIGHT: 73 IN | SYSTOLIC BLOOD PRESSURE: 188 MMHG | BODY MASS INDEX: 27.17 KG/M2 | TEMPERATURE: 98.4 F | OXYGEN SATURATION: 99 % | RESPIRATION RATE: 16 BRPM | DIASTOLIC BLOOD PRESSURE: 85 MMHG | HEART RATE: 88 BPM

## 2019-05-31 PROCEDURE — NC001 PR NO CHARGE: Performed by: ANESTHESIOLOGY

## 2019-05-31 PROCEDURE — 72020 X-RAY EXAM OF SPINE 1 VIEW: CPT

## 2019-05-31 PROCEDURE — 62321 NJX INTERLAMINAR CRV/THRC: CPT | Performed by: ANESTHESIOLOGY

## 2019-05-31 RX ORDER — MAGNESIUM HYDROXIDE 1200 MG/15ML
LIQUID ORAL AS NEEDED
Status: DISCONTINUED | OUTPATIENT
Start: 2019-05-31 | End: 2019-05-31 | Stop reason: HOSPADM

## 2019-05-31 RX ORDER — LIDOCAINE WITH 8.4% SOD BICARB 0.9%(10ML)
SYRINGE (ML) INJECTION AS NEEDED
Status: DISCONTINUED | OUTPATIENT
Start: 2019-05-31 | End: 2019-05-31 | Stop reason: HOSPADM

## 2019-05-31 RX ORDER — METHYLPREDNISOLONE ACETATE 80 MG/ML
INJECTION, SUSPENSION INTRA-ARTICULAR; INTRALESIONAL; INTRAMUSCULAR; SOFT TISSUE AS NEEDED
Status: DISCONTINUED | OUTPATIENT
Start: 2019-05-31 | End: 2019-05-31 | Stop reason: HOSPADM

## 2019-06-07 ENCOUNTER — TELEPHONE (OUTPATIENT)
Dept: PAIN MEDICINE | Facility: CLINIC | Age: 64
End: 2019-06-07

## 2019-06-27 ENCOUNTER — OFFICE VISIT (OUTPATIENT)
Dept: PAIN MEDICINE | Facility: CLINIC | Age: 64
End: 2019-06-27
Payer: COMMERCIAL

## 2019-06-27 VITALS
DIASTOLIC BLOOD PRESSURE: 74 MMHG | WEIGHT: 200.4 LBS | BODY MASS INDEX: 26.56 KG/M2 | HEIGHT: 73 IN | SYSTOLIC BLOOD PRESSURE: 111 MMHG | HEART RATE: 92 BPM

## 2019-06-27 DIAGNOSIS — M54.2 CERVICALGIA: ICD-10-CM

## 2019-06-27 DIAGNOSIS — G89.4 CHRONIC PAIN SYNDROME: Primary | ICD-10-CM

## 2019-06-27 DIAGNOSIS — M50.322 DEGENERATION OF C5-C6 INTERVERTEBRAL DISC: ICD-10-CM

## 2019-06-27 DIAGNOSIS — M48.02 CERVICAL SPINAL STENOSIS: ICD-10-CM

## 2019-06-27 DIAGNOSIS — M54.12 CERVICAL RADICULITIS: ICD-10-CM

## 2019-06-27 PROCEDURE — 99213 OFFICE O/P EST LOW 20 MIN: CPT | Performed by: ANESTHESIOLOGY

## 2019-09-03 DIAGNOSIS — E78.5 DYSLIPIDEMIA: ICD-10-CM

## 2019-09-03 RX ORDER — ATORVASTATIN CALCIUM 10 MG/1
10 TABLET, FILM COATED ORAL DAILY
Qty: 30 TABLET | Refills: 5 | Status: SHIPPED | OUTPATIENT
Start: 2019-09-03 | End: 2020-02-20

## 2019-10-11 DIAGNOSIS — I10 BENIGN ESSENTIAL HYPERTENSION: ICD-10-CM

## 2019-10-11 RX ORDER — LISINOPRIL AND HYDROCHLOROTHIAZIDE 20; 12.5 MG/1; MG/1
1 TABLET ORAL DAILY
Qty: 30 TABLET | Refills: 6 | Status: SHIPPED | OUTPATIENT
Start: 2019-10-11 | End: 2020-06-04

## 2020-02-04 ENCOUNTER — OFFICE VISIT (OUTPATIENT)
Dept: FAMILY MEDICINE CLINIC | Facility: CLINIC | Age: 65
End: 2020-02-04
Payer: COMMERCIAL

## 2020-02-04 VITALS
SYSTOLIC BLOOD PRESSURE: 150 MMHG | HEART RATE: 92 BPM | WEIGHT: 205 LBS | TEMPERATURE: 98.5 F | BODY MASS INDEX: 27.17 KG/M2 | RESPIRATION RATE: 14 BRPM | DIASTOLIC BLOOD PRESSURE: 72 MMHG | HEIGHT: 73 IN

## 2020-02-04 DIAGNOSIS — M54.50 ACUTE LEFT-SIDED LOW BACK PAIN WITHOUT SCIATICA: Primary | ICD-10-CM

## 2020-02-04 DIAGNOSIS — I10 BENIGN ESSENTIAL HYPERTENSION: ICD-10-CM

## 2020-02-04 PROCEDURE — 1036F TOBACCO NON-USER: CPT | Performed by: FAMILY MEDICINE

## 2020-02-04 PROCEDURE — 3077F SYST BP >= 140 MM HG: CPT | Performed by: FAMILY MEDICINE

## 2020-02-04 PROCEDURE — 99213 OFFICE O/P EST LOW 20 MIN: CPT | Performed by: FAMILY MEDICINE

## 2020-02-04 PROCEDURE — 3078F DIAST BP <80 MM HG: CPT | Performed by: FAMILY MEDICINE

## 2020-02-04 RX ORDER — BACLOFEN 10 MG/1
10 TABLET ORAL 2 TIMES DAILY
Qty: 30 TABLET | Refills: 0 | Status: SHIPPED | OUTPATIENT
Start: 2020-02-04 | End: 2020-02-27

## 2020-02-04 NOTE — PROGRESS NOTES
Assessment/Plan:     Diagnoses and all orders for this visit:    Acute left-sided low back pain without sciatica  -     baclofen 10 mg tablet; Take 1 tablet (10 mg total) by mouth 2 (two) times a day  Will try baclofen at this time as it is likely muscle pain/hematoma from twisting abnormally  If no improvement in the next 2-3 weeks patient should return for evaluation and xray  Advised compresses to the area  If any changes in bowel or bladder patient should go to the ER right away  Patient agrees with plan  Benign essential hypertension    BP elevated today at 150/72 couldn't be secondary to pain  Advised to monitor at home and rtc in 2 weeks for bp check  Subjective:      Patient ID: Ignacio Griffin is a 60 y o  male  HPI  59 y/o male presents with left side back/flank pain that started on Friday  He was going down the stairs and and twisted from almost falling with the railing  He didn't hit the ground  It feels sore and gradually worsening pain that travels down the left leg  Pain is 10/10 that is sharp in character  No bowel or bladder incontinence  No fevers or chills  Has tried ibuprofen, tylenol, ice packs, and heating pad with no relief  The following portions of the patient's history were reviewed and updated as appropriate: allergies, current medications, past family history, past medical history, past social history, past surgical history and problem list     Review of Systems   Constitutional: Negative for chills and fever  HENT: Negative for sore throat  Eyes: Negative for discharge  Respiratory: Negative for cough and shortness of breath  Cardiovascular: Negative for chest pain  Gastrointestinal: Negative for abdominal pain, constipation, nausea and vomiting  Genitourinary: Negative for decreased urine volume and difficulty urinating  Musculoskeletal: Positive for back pain and gait problem  Negative for neck pain and neck stiffness  Skin: Negative for rash  Neurological: Negative for dizziness, weakness, light-headedness and headaches  Psychiatric/Behavioral: Negative for agitation  Objective:      /72 (BP Location: Left arm, Patient Position: Sitting, Cuff Size: Standard)   Pulse 92   Temp 98 5 °F (36 9 °C) (Tympanic)   Resp 14   Ht 6' 1" (1 854 m)   Wt 93 kg (205 lb)   BMI 27 05 kg/m²          Physical Exam   Constitutional: He is oriented to person, place, and time  He appears well-developed and well-nourished  HENT:   Head: Normocephalic and atraumatic  Nose: Nose normal    Mouth/Throat: Oropharynx is clear and moist    Eyes: EOM are normal  Right eye exhibits no discharge  Left eye exhibits no discharge  Neck: Normal range of motion  Neck supple  Cardiovascular: Normal rate, regular rhythm, normal heart sounds and intact distal pulses  Pulmonary/Chest: Effort normal and breath sounds normal  He has no wheezes  Abdominal: Soft  Bowel sounds are normal  There is no tenderness  Musculoskeletal: He exhibits no edema or tenderness  No pain on mid back palpation   Slight Swelling to the left flank pain,no pain on palpation, no discoloration  Strength intact bilateral   DTRs normal   Sensation intact   Neurological: He is alert and oriented to person, place, and time  He displays normal reflexes  No sensory deficit  He exhibits normal muscle tone  Coordination normal    Skin: Skin is warm  No erythema  Psychiatric: He has a normal mood and affect  His behavior is normal    Vitals reviewed

## 2020-02-11 ENCOUNTER — OFFICE VISIT (OUTPATIENT)
Dept: PAIN MEDICINE | Facility: CLINIC | Age: 65
End: 2020-02-11
Payer: COMMERCIAL

## 2020-02-11 VITALS
SYSTOLIC BLOOD PRESSURE: 137 MMHG | WEIGHT: 206.2 LBS | DIASTOLIC BLOOD PRESSURE: 88 MMHG | HEART RATE: 94 BPM | BODY MASS INDEX: 27.33 KG/M2 | HEIGHT: 73 IN

## 2020-02-11 DIAGNOSIS — M54.42 ACUTE LEFT-SIDED LOW BACK PAIN WITH LEFT-SIDED SCIATICA: Primary | ICD-10-CM

## 2020-02-11 DIAGNOSIS — M54.16 LUMBAR RADICULOPATHY: ICD-10-CM

## 2020-02-11 PROCEDURE — 99244 OFF/OP CNSLTJ NEW/EST MOD 40: CPT | Performed by: ANESTHESIOLOGY

## 2020-02-11 NOTE — PROGRESS NOTES
Assessment:  1  Acute left-sided low back pain with left-sided sciatica    2  Lumbar radiculopathy        Plan:  Orders Placed This Encounter   Procedures    Ambulatory referral to Physical Therapy     Standing Status:   Future     Standing Expiration Date:   2/11/2021     Referral Priority:   Routine     Referral Type:   Physical Therapy     Referral Reason:   Specialty Services Required     Requested Specialty:   Physical Therapy     Number of Visits Requested:   1     Expiration Date:   2/11/2021     My impressions and treatment recommendations were discussed in detail with the patient, who verbalized understanding and had no further questions  The patient is reporting low back pain with radiation into his left hip at this point in time  He has not yet undergone any imaging for his low back or left hip  He has only had symptoms for about 1 week's time  At this point, I feel reasonable to have the patient undergo a course of physical therapy 2-3 times per week for 4-6 weeks  If he does not respond to physical therapy, I will obtain a MRI of the low back to evaluate for any pathology that may be contributing to his pain complaints  Follow-up is planned after he completes his course of physical therapy  Discharge instructions were provided  I personally saw and examined the patient and I agree with the above discussed plan of care  History of Present Illness:    Catalina Hogan is a 60 y o  male who presents to [facility] for initial evaluation of the above stated pain complaints  The patient has a past medical and chronic pain history as outlined in the assessment section  He was referred by JOHN Valenzuela  The patient is reporting a one-week history of left-sided low back pain, left buttocks pain, and radiation of the pain into the left hip region  He describes his pain as worse in the morning, evening, and night  His pain is intermittent in nature    He reports the quality of his pain as "sharp    He states that his pain occurred while he was working and was severe about 1 week ago, but it has not completely gone away  The pain has improved significantly, but it still continues to bother him  Review of Systems:    Review of Systems   Constitutional: Negative for fever and unexpected weight change  HENT: Negative for trouble swallowing  Eyes: Negative for visual disturbance  Respiratory: Negative for shortness of breath and wheezing  Cardiovascular: Negative for chest pain and palpitations  Gastrointestinal: Negative for constipation, diarrhea, nausea and vomiting  Endocrine: Negative for cold intolerance, heat intolerance and polydipsia  Genitourinary: Negative for difficulty urinating and frequency  Musculoskeletal: Positive for back pain and gait problem  Negative for arthralgias, joint swelling and myalgias  Decreaded ROM, Pain in left leg, Joint Stiffness  Skin: Negative for rash  Neurological: Negative for dizziness, seizures, syncope, weakness and headaches  Hematological: Does not bruise/bleed easily  Psychiatric/Behavioral: Negative for dysphoric mood  All other systems reviewed and are negative          Patient Active Problem List   Diagnosis    Allergic rhinitis    Benign essential hypertension    Esophageal reflux    Cervical spinal stenosis    Degeneration of C5-C6 intervertebral disc    Cervicalgia    Chronic pain syndrome    Cervical radiculitis       Past Medical History:   Diagnosis Date    Acute pansinusitis     Bicipital tendinitis of left shoulder     GERD (gastroesophageal reflux disease)     Hyperlipidemia     Hypertension     Lateral epicondylitis     Noninfectious dermatoses of eyelid     Pneumonia     Rotator cuff tendinitis     Sciatica     Skin lesion        Past Surgical History:   Procedure Laterality Date    EPIDURAL BLOCK INJECTION N/A 6/13/2018    Procedure: C6 C7 Cervical Epidural Steroid Injection;  Surgeon: Danyell Mack MD;  Location: [facility] MAIN OR;  Service: Pain Management     EPIDURAL BLOCK INJECTION N/A 7/13/2018    Procedure: C6 C7 Cervical Epidural Steroid Injection (19977); Surgeon: Danyell Mack MD;  Location: [facility] MAIN OR;  Service: Pain Management     EPIDURAL BLOCK INJECTION N/A 10/12/2018    Procedure: C6 C7 Cervical Epidural Steroid Injection (69870); Surgeon: Danyell Mack MD;  Location: [facility] MAIN OR;  Service: Pain Management     EPIDURAL BLOCK INJECTION N/A 5/31/2019    Procedure: C6 C7 Cervical Epidural Steroid Injection (64428);   Surgeon: Danyell Mack MD;  Location: [facility] MAIN OR;  Service: Pain Management        Family History   Problem Relation Age of Onset    No Known Problems Mother     No Known Problems Father        Social History     Occupational History    Not on file   Tobacco Use    Smoking status: Former Smoker    Smokeless tobacco: Never Used   Substance and Sexual Activity    Alcohol use: No    Drug use: Not on file    Sexual activity: Not on file         Current Outpatient Medications:     ascorbic acid (VITAMIN C) 500 mg tablet, Take 500 mg by mouth daily, Disp: , Rfl:     aspirin (ECOTRIN LOW STRENGTH) 81 mg EC tablet, Take 81 mg by mouth daily, Disp: , Rfl:     atorvastatin (LIPITOR) 10 mg tablet, Take 1 tablet (10 mg total) by mouth daily, Disp: 30 tablet, Rfl: 5    baclofen 10 mg tablet, Take 1 tablet (10 mg total) by mouth 2 (two) times a day, Disp: 30 tablet, Rfl: 0    cyanocobalamin (VITAMIN B-12) 1,000 mcg tablet, Take by mouth daily, Disp: , Rfl:     lisinopril-hydrochlorothiazide (PRINZIDE,ZESTORETIC) 20-12 5 MG per tablet, Take 1 tablet by mouth daily, Disp: 30 tablet, Rfl: 6    omeprazole (PriLOSEC) 40 MG capsule, Take 1 capsule (40 mg total) by mouth daily, Disp: 30 capsule, Rfl: 6    No Known Allergies    Physical Exam:    /88   Pulse 94   Ht 6' 1" (1 854 m)   Wt 93 5 kg (206 lb 3 2 oz)   BMI 27 20 kg/m²     Constitutional: normal, well developed, well nourished, alert, in no distress and non-toxic and no overt pain behavior    Eyes: anicteric  HEENT: grossly intact  Neck: supple, symmetric, trachea midline and no masses   Pulmonary:even and unlabored  Cardiovascular:No edema or pitting edema present  Skin:Normal without rashes or lesions and well hydrated  Psychiatric:Mood and affect appropriate  Neurologic:Cranial Nerves II-XII grossly intact  Musculoskeletal:normal     Lumbar Spine Exam    Appearance:  Normal lordosis  Palpation/Tenderness:  no tenderness or spasm  Sensory:  no sensory deficits noted  Range of Motion:  Flexion:  No limitation  without pain  Extension:  No limitation  without pain  Lateral Flexion - Left:  No limitation  without pain  Lateral Flexion - Right:  No limitation  without pain  Rotation - Left:  No limitation  without pain  Rotation - Right:  No limitation  without pain   Lumbar facet loading is negative bilaterally  Motor Strength:  Left hip flexion:  5/5  Left hip extension:  5/5  Right hip flexion:  5/5  Right hip extension:  5/5  Left knee flexion:  5/5  Left knee extension:  5/5  Right knee flexion:  5/5  Right knee extension:  5/5  Left foot dorsiflexion:  5/5  Left foot plantar flexion:  5/5  Right foot dorsiflexion:  5/5  Right foot plantar flexion:  5/5  Reflexes:  Left Patellar:  2+   Right Patellar:  2+   Left Achilles:  2+   Right Achilles:  2+   Special Tests:  Left Straight Leg Test:  negative  Right Straight Leg Test:  negative  Left Baltazar's Maneuver:  negative  Right Baltazar's Maneuver:  negative    Orders Placed This Encounter   Procedures    Ambulatory referral to Physical Therapy

## 2020-02-18 ENCOUNTER — EVALUATION (OUTPATIENT)
Dept: PHYSICAL THERAPY | Facility: CLINIC | Age: 65
End: 2020-02-18
Payer: COMMERCIAL

## 2020-02-18 DIAGNOSIS — M54.16 LUMBAR RADICULOPATHY: ICD-10-CM

## 2020-02-18 DIAGNOSIS — M54.42 ACUTE LEFT-SIDED LOW BACK PAIN WITH LEFT-SIDED SCIATICA: ICD-10-CM

## 2020-02-18 PROCEDURE — 97161 PT EVAL LOW COMPLEX 20 MIN: CPT | Performed by: PHYSICAL THERAPIST

## 2020-02-18 NOTE — PROGRESS NOTES
PT Evaluation     Today's date: 2020  Patient name: Gene Serrano  :   MRN: [id number]  Referring provider: Lawson Lambert MD  Dx:   Encounter Diagnosis     ICD-10-CM    1  Acute left-sided low back pain with left-sided sciatica M54 42 Ambulatory referral to Physical Therapy   2  Lumbar radiculopathy M54 16 Ambulatory referral to Physical Therapy                  Assessment  Assessment details: Javier Block Meyerspresents with signs and symptoms consistent with Acute left-sided low back pain with left-sided sciatica  Lumbar radiculopathy, with loss of range of motion, strength and spinal stabilization  Presents with high reactivity  Gene Serrano would benefit with physical therapy to address these impairments to return to prior level of function  Impairments: abnormal or restricted ROM, activity intolerance, impaired physical strength, lacks appropriate home exercise program, pain with function and poor posture   Understanding of Dx/Px/POC: good   Prognosis: good    Goals  STG  Initiate HEP  Able to work 8 hours without pain in 3 weeks  LTG  Independent with HEP  Able to return to full activity level without pain in 6 weeks  FOTO > 54 in 6 weeks    Plan  Planned therapy interventions: manual therapy, neuromuscular re-education, patient education, postural training, strengthening, stretching, therapeutic exercise and home exercise program  Frequency: 2x week  Duration in visits: 12  Duration in weeks: 6  Treatment plan discussed with: patient        Subjective Evaluation    History of Present Illness  Mechanism of injury: Patient reports developing back pain when his dog pulled him and he broke his fall on his deck  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  The pain increased the next 2 days and began to radiate down the left leg  He was given a muscle relaxant that has helped decrease the pain intensity              Recurrent probem    Quality of life: good    Pain  Current pain ratin  At best pain ratin  At worst pain rating: 10  Location: left low back  Quality: dull ache and radiating  Relieving factors: change in position  Aggravating factors: lifting and overhead activity    Social Support  Steps to enter house: yes  Stairs in house: yes   Lives with: spouse    Employment status: working  Hand dominance: right    Treatments  Current treatment: physical therapy  Patient Goals  Patient goals for therapy: decreased pain, improved balance, increased motion, increased strength, independence with ADLs/IADLs, return to work and return to [employer] activities          Objective     Concurrent Complaints  Negative for night pain, disturbed sleep, bladder dysfunction, bowel dysfunction, saddle (S4) numbness, history of cancer and history of trauma    Postural Observations  Seated posture: poor  Standing posture: poor  Correction of posture: makes symptoms better        Palpation     Right   Hypertonic in the iliopsoas  Tenderness of the iliopsoas       Active Range of Motion     Lumbar   Flexion: 45 degrees   Extension: 20 degrees     Strength/Myotome Testing     Lumbar   Left   Heel walk: normal  Toe walk: normal    Right   Heel walk: normal  Toe walk: normal    Left Hip   Planes of Motion   Flexion: 5    Right Hip   Planes of Motion   Flexion: 5    Left Knee   Flexion: 5  Extension: 5    Right Knee   Flexion: 5  Extension: 5    Left Ankle/Foot   Dorsiflexion: 5  Plantar flexion: 5  Great toe extension: 5    Right Ankle/Foot   Dorsiflexion: 5  Plantar flexion: 5  Great toe extension: 5             Precautions: HTN    Manual                                                                                   Exercise Diary Modalities

## 2020-02-20 ENCOUNTER — OFFICE VISIT (OUTPATIENT)
Dept: PHYSICAL THERAPY | Facility: CLINIC | Age: 65
End: 2020-02-20
Payer: COMMERCIAL

## 2020-02-20 DIAGNOSIS — M54.16 LUMBAR RADICULOPATHY: ICD-10-CM

## 2020-02-20 DIAGNOSIS — M54.42 ACUTE LEFT-SIDED LOW BACK PAIN WITH LEFT-SIDED SCIATICA: Primary | ICD-10-CM

## 2020-02-20 DIAGNOSIS — E78.5 DYSLIPIDEMIA: ICD-10-CM

## 2020-02-20 PROCEDURE — 97112 NEUROMUSCULAR REEDUCATION: CPT | Performed by: PHYSICAL THERAPIST

## 2020-02-20 PROCEDURE — 97140 MANUAL THERAPY 1/> REGIONS: CPT | Performed by: PHYSICAL THERAPIST

## 2020-02-20 RX ORDER — ATORVASTATIN CALCIUM 10 MG/1
TABLET, FILM COATED ORAL
Qty: 30 TABLET | Refills: 5 | Status: SHIPPED | OUTPATIENT
Start: 2020-02-20 | End: 2020-09-01

## 2020-02-20 NOTE — PROGRESS NOTES
Daily Note     Today's date: 2020  Patient name: Estephanie Smart  : [id number]  MRN: [id number]  Referring provider: Ghassan Oliveros MD  Dx:   Encounter Diagnosis     ICD-10-CM    1  Acute left-sided low back pain with left-sided sciatica M54 42    2  Lumbar radiculopathy M54 16                   Subjective: Working on correcting posture  Objective: See treatment diary below      Assessment: Tolerated treatment well  Patient demonstrated fatigue post treatment and exhibited good technique with therapeutic exercises      Plan: Continue per plan of care        Precautions: HTN    Manual              STM psoas perf            SI leg lengthening gr4 1x                                                       Exercise Diary              TM  2 3 x 10 min            Neurac supine pelvic lift 2x5            Neurac Bridge 2x5            Neurac prone plank 2x5            Neurac SDLY Hip ABD 2x5            Neurac SDLY Hip ADD 2x5            Hip ADD Ball Squeeze 10sx10            Psoas stretch in supine and 1/2 kneel 5x2                                                                                                                                                                            Modalities

## 2020-02-24 ENCOUNTER — OFFICE VISIT (OUTPATIENT)
Dept: PHYSICAL THERAPY | Facility: CLINIC | Age: 65
End: 2020-02-24
Payer: COMMERCIAL

## 2020-02-24 DIAGNOSIS — M54.42 ACUTE LEFT-SIDED LOW BACK PAIN WITH LEFT-SIDED SCIATICA: Primary | ICD-10-CM

## 2020-02-24 DIAGNOSIS — M54.16 LUMBAR RADICULOPATHY: ICD-10-CM

## 2020-02-24 PROCEDURE — 97110 THERAPEUTIC EXERCISES: CPT | Performed by: PHYSICAL THERAPIST

## 2020-02-24 NOTE — PROGRESS NOTES
PT Discharge    Today's date: 2020  Patient name: Gene Serrano  :   MRN: [id number]  Referring provider: Lawson Lambert MD  Dx: No diagnosis found  Assessment  Assessment details: Gene Serrano has been seen for Acute left-sided low back pain with left-sided sciatica  (primary encounter diagnosis)  Lumbar radiculopathy,and has met the goals of physical therapy  And is independent with a HEP  Plan  Plan details: Discontinue PT        Subjective Evaluation    History of Present Illness  Mechanism of injury: Patient reports no low back pain, able to return to full gym workouts    Pain  Current pain ratin  At best pain ratin  At worst pain ratin          Objective     Active Range of Motion     Lumbar   Flexion: 60 degrees   Extension: 35 degrees           Precautions: HTN    Manual              STM psoas perf            SI leg lengthening gr4 1x                                                       Exercise Diary             TM  2 3 x 10 min 3 5 x12 min           Neurac supine pelvic lift 2x5 2x5           Neurac Bridge 2x5 2x5           Neurac prone plank 2x5 2x5           Neurac SDLY Hip ABD 2x5 2x5           Neurac SDLY Hip ADD 2x5 2x5           Hip ADD Ball Squeeze 10sx10            Psoas stretch in supine and 1/2 kneel 5x2 5x2                                                                                                                                                                           Modalities

## 2020-02-27 ENCOUNTER — OFFICE VISIT (OUTPATIENT)
Dept: FAMILY MEDICINE CLINIC | Facility: CLINIC | Age: 65
End: 2020-02-27
Payer: COMMERCIAL

## 2020-02-27 ENCOUNTER — APPOINTMENT (OUTPATIENT)
Dept: PHYSICAL THERAPY | Facility: CLINIC | Age: 65
End: 2020-02-27
Payer: COMMERCIAL

## 2020-02-27 VITALS
RESPIRATION RATE: 14 BRPM | HEART RATE: 84 BPM | TEMPERATURE: 98.1 F | DIASTOLIC BLOOD PRESSURE: 90 MMHG | SYSTOLIC BLOOD PRESSURE: 162 MMHG | HEIGHT: 73 IN | BODY MASS INDEX: 27.43 KG/M2 | OXYGEN SATURATION: 97 % | WEIGHT: 207 LBS

## 2020-02-27 DIAGNOSIS — J01.10 ACUTE NON-RECURRENT FRONTAL SINUSITIS: Primary | ICD-10-CM

## 2020-02-27 DIAGNOSIS — I10 BENIGN ESSENTIAL HYPERTENSION: ICD-10-CM

## 2020-02-27 PROCEDURE — 3080F DIAST BP >= 90 MM HG: CPT | Performed by: FAMILY MEDICINE

## 2020-02-27 PROCEDURE — 3077F SYST BP >= 140 MM HG: CPT | Performed by: FAMILY MEDICINE

## 2020-02-27 PROCEDURE — 1036F TOBACCO NON-USER: CPT | Performed by: FAMILY MEDICINE

## 2020-02-27 PROCEDURE — 99213 OFFICE O/P EST LOW 20 MIN: CPT | Performed by: FAMILY MEDICINE

## 2020-02-27 PROCEDURE — 3008F BODY MASS INDEX DOCD: CPT | Performed by: FAMILY MEDICINE

## 2020-02-27 RX ORDER — AMOXICILLIN AND CLAVULANATE POTASSIUM 875; 125 MG/1; MG/1
1 TABLET, FILM COATED ORAL EVERY 12 HOURS SCHEDULED
Qty: 14 TABLET | Refills: 0 | Status: SHIPPED | OUTPATIENT
Start: 2020-02-27 | End: 2020-03-05

## 2020-02-27 RX ORDER — FLUTICASONE PROPIONATE 50 MCG
1 SPRAY, SUSPENSION (ML) NASAL DAILY
Qty: 1 BOTTLE | Refills: 0 | Status: SHIPPED | OUTPATIENT
Start: 2020-02-27 | End: 2022-04-12

## 2020-02-27 NOTE — PROGRESS NOTES
Assessment/Plan:    1  Acute non-recurrent frontal sinusitis  -     amoxicillin-clavulanate (Augmentin) 875-125 mg per tablet; Take 1 tablet by mouth every 12 (twelve) hours for 7 days  -     fluticasone (FLONASE) 50 mcg/act nasal spray; 1 spray into each nostril daily    2  Benign essential hypertension  Assessment & Plan:  BP elevated at last two visit  Could be secondary to illness right now  Patient advised to monitor BP at home  No chest pain or shortness of breath  Should follow up with pcp in 2 weeks for BP check  Precautions reviewed  Advised supportive care with fluids, rest, and tylenol  Patient given augmentin  Side effects and precautions reviewed with patient  If any worsening symptoms, patient should be evaluated  There are no Patient Instructions on file for this visit  Return in about 1 month (around 3/27/2020) for Annual physical     Subjective:      Patient ID: Kinga Felder is a 60 y o  male  Chief Complaint   Patient presents with    Cold Like Symptoms     cough/congestion started on Monday       HPI  Sinus Pain  Patient complains of congestion, cough and nasal congestion  Complains with scratchy throat with phlegm and then started to cough  Cough worse when laying down with white phlegm  No fevers or chills  Onset of symptoms was 5 day ago  Symptoms have been gradually worsening since that time  He is drinking moderate amounts of fluids  Past history is significant for nothing  Patient is former smoker, quit 20 years ago  Has tried Claritin and Decongests along with ibuprofen  Has been doing salt water gargle which has helped sore throat  The following portions of the patient's history were reviewed and updated as appropriate: allergies, current medications, past family history, past medical history, past social history, past surgical history and problem list     Review of Systems   Constitutional: Positive for appetite change and fatigue  Negative for chills and fever  HENT: Positive for congestion, postnasal drip and sinus pressure  Negative for sore throat  Respiratory: Positive for cough  Negative for shortness of breath  Cardiovascular: Negative for chest pain  Gastrointestinal: Negative for abdominal pain, constipation, nausea and vomiting  Genitourinary: Negative for dysuria  Skin: Negative for rash  Neurological: Negative for dizziness, weakness, light-headedness and headaches  Psychiatric/Behavioral: Negative for agitation  Current Outpatient Medications   Medication Sig Dispense Refill    ascorbic acid (VITAMIN C) 500 mg tablet Take 500 mg by mouth daily      aspirin (ECOTRIN LOW STRENGTH) 81 mg EC tablet Take 81 mg by mouth daily      atorvastatin (LIPITOR) 10 mg tablet TAKE 1 TABLET BY MOUTH EVERY DAY 30 tablet 5    cyanocobalamin (VITAMIN B-12) 1,000 mcg tablet Take by mouth daily      lisinopril-hydrochlorothiazide (PRINZIDE,ZESTORETIC) 20-12 5 MG per tablet Take 1 tablet by mouth daily 30 tablet 6    omeprazole (PriLOSEC) 40 MG capsule Take 1 capsule (40 mg total) by mouth daily 30 capsule 6    amoxicillin-clavulanate (Augmentin) 875-125 mg per tablet Take 1 tablet by mouth every 12 (twelve) hours for 7 days 14 tablet 0    fluticasone (FLONASE) 50 mcg/act nasal spray 1 spray into each nostril daily 1 Bottle 0     No current facility-administered medications for this visit  Objective:    /90 (BP Location: Left arm, Patient Position: Sitting, Cuff Size: Adult)   Pulse 84   Temp 98 1 °F (36 7 °C) (Tympanic)   Resp 14   Ht 6' 1" (1 854 m)   Wt 93 9 kg (207 lb)   SpO2 97%   BMI 27 31 kg/m²        Physical Exam   Constitutional: He is oriented to person, place, and time  He appears well-developed and well-nourished  HENT:   Head: Normocephalic and atraumatic  Right Ear: External ear normal    Left Ear: External ear normal    Nose: Nose normal    Mouth/Throat: Oropharynx is clear and moist  No oropharyngeal exudate  Sinus pressure on palpation to frontal sinus    Eyes: Conjunctivae and EOM are normal  Right eye exhibits no discharge  Left eye exhibits no discharge  Neck: Normal range of motion  Neck supple  Cardiovascular: Normal rate, regular rhythm, normal heart sounds and intact distal pulses  Pulmonary/Chest: Effort normal and breath sounds normal  He has no wheezes  Abdominal: Soft  Bowel sounds are normal  There is no tenderness  Lymphadenopathy:     He has no cervical adenopathy  Neurological: He is alert and oriented to person, place, and time  Skin: Skin is warm  No erythema  Psychiatric: He has a normal mood and affect  His behavior is normal    Vitals reviewed               Madelin Taylor MD

## 2020-02-27 NOTE — ASSESSMENT & PLAN NOTE
BP elevated at last two visit  Could be secondary to illness right now  Patient advised to monitor BP at home  No chest pain or shortness of breath  Should follow up with pcp in 2 weeks for BP check  Precautions reviewed

## 2020-04-06 ENCOUNTER — TELEPHONE (OUTPATIENT)
Dept: PAIN MEDICINE | Facility: CLINIC | Age: 65
End: 2020-04-06

## 2020-05-28 ENCOUNTER — TELEPHONE (OUTPATIENT)
Dept: PAIN MEDICINE | Facility: CLINIC | Age: 65
End: 2020-05-28

## 2020-06-01 ENCOUNTER — TELEPHONE (OUTPATIENT)
Dept: PAIN MEDICINE | Facility: CLINIC | Age: 65
End: 2020-06-01

## 2020-06-03 DIAGNOSIS — I10 BENIGN ESSENTIAL HYPERTENSION: ICD-10-CM

## 2020-06-04 RX ORDER — LISINOPRIL AND HYDROCHLOROTHIAZIDE 20; 12.5 MG/1; MG/1
TABLET ORAL
Qty: 30 TABLET | Refills: 6 | Status: SHIPPED | OUTPATIENT
Start: 2020-06-04 | End: 2021-01-11 | Stop reason: SDUPTHER

## 2020-09-01 DIAGNOSIS — E78.5 DYSLIPIDEMIA: ICD-10-CM

## 2020-09-01 RX ORDER — ATORVASTATIN CALCIUM 10 MG/1
TABLET, FILM COATED ORAL
Qty: 30 TABLET | Refills: 5 | Status: SHIPPED | OUTPATIENT
Start: 2020-09-01 | End: 2021-03-15 | Stop reason: SDUPTHER

## 2020-09-29 ENCOUNTER — CLINICAL SUPPORT (OUTPATIENT)
Dept: FAMILY MEDICINE CLINIC | Facility: CLINIC | Age: 65
End: 2020-09-29
Payer: COMMERCIAL

## 2020-09-29 DIAGNOSIS — Z23 NEED FOR VACCINATION: Primary | ICD-10-CM

## 2020-09-29 PROCEDURE — 90471 IMMUNIZATION ADMIN: CPT

## 2020-09-29 PROCEDURE — 90682 RIV4 VACC RECOMBINANT DNA IM: CPT

## 2021-01-11 DIAGNOSIS — I10 BENIGN ESSENTIAL HYPERTENSION: ICD-10-CM

## 2021-01-11 RX ORDER — LISINOPRIL AND HYDROCHLOROTHIAZIDE 20; 12.5 MG/1; MG/1
TABLET ORAL
Qty: 30 TABLET | Refills: 6 | Status: SHIPPED | OUTPATIENT
Start: 2021-01-11

## 2021-03-15 DIAGNOSIS — E78.5 DYSLIPIDEMIA: ICD-10-CM

## 2021-03-15 RX ORDER — ATORVASTATIN CALCIUM 10 MG/1
10 TABLET, FILM COATED ORAL DAILY
Qty: 30 TABLET | Refills: 1 | Status: SHIPPED | OUTPATIENT
Start: 2021-03-15

## 2021-03-31 DIAGNOSIS — Z23 ENCOUNTER FOR IMMUNIZATION: ICD-10-CM

## 2021-04-06 ENCOUNTER — IMMUNIZATIONS (OUTPATIENT)
Dept: FAMILY MEDICINE CLINIC | Facility: HOSPITAL | Age: 66
End: 2021-04-06

## 2021-04-06 DIAGNOSIS — Z23 ENCOUNTER FOR IMMUNIZATION: Primary | ICD-10-CM

## 2021-04-06 PROCEDURE — 91300 SARS-COV-2 / COVID-19 MRNA VACCINE (PFIZER-BIONTECH) 30 MCG: CPT

## 2021-04-06 PROCEDURE — 0001A SARS-COV-2 / COVID-19 MRNA VACCINE (PFIZER-BIONTECH) 30 MCG: CPT

## 2021-04-14 DIAGNOSIS — E78.5 DYSLIPIDEMIA: ICD-10-CM

## 2021-04-15 RX ORDER — ATORVASTATIN CALCIUM 10 MG/1
TABLET, FILM COATED ORAL
Qty: 30 TABLET | Refills: 1 | OUTPATIENT
Start: 2021-04-15

## 2021-05-01 ENCOUNTER — IMMUNIZATIONS (OUTPATIENT)
Dept: FAMILY MEDICINE CLINIC | Facility: HOSPITAL | Age: 66
End: 2021-05-01

## 2021-05-01 DIAGNOSIS — Z23 ENCOUNTER FOR IMMUNIZATION: Primary | ICD-10-CM

## 2021-05-01 PROCEDURE — 0002A SARS-COV-2 / COVID-19 MRNA VACCINE (PFIZER-BIONTECH) 30 MCG: CPT

## 2021-05-01 PROCEDURE — 91300 SARS-COV-2 / COVID-19 MRNA VACCINE (PFIZER-BIONTECH) 30 MCG: CPT

## 2021-05-11 DIAGNOSIS — E78.5 DYSLIPIDEMIA: ICD-10-CM

## 2021-05-11 RX ORDER — ATORVASTATIN CALCIUM 10 MG/1
TABLET, FILM COATED ORAL
Qty: 30 TABLET | Refills: 1 | OUTPATIENT
Start: 2021-05-11

## 2021-08-14 DIAGNOSIS — I10 BENIGN ESSENTIAL HYPERTENSION: ICD-10-CM

## 2021-08-16 RX ORDER — LISINOPRIL AND HYDROCHLOROTHIAZIDE 20; 12.5 MG/1; MG/1
1 TABLET ORAL DAILY
Qty: 30 TABLET | Refills: 6 | OUTPATIENT
Start: 2021-08-16

## 2021-08-18 DIAGNOSIS — I10 BENIGN ESSENTIAL HYPERTENSION: ICD-10-CM

## 2021-08-19 RX ORDER — LISINOPRIL AND HYDROCHLOROTHIAZIDE 20; 12.5 MG/1; MG/1
1 TABLET ORAL DAILY
Qty: 30 TABLET | Refills: 6 | OUTPATIENT
Start: 2021-08-19

## 2021-10-13 ENCOUNTER — CLINICAL SUPPORT (OUTPATIENT)
Dept: FAMILY MEDICINE CLINIC | Facility: CLINIC | Age: 66
End: 2021-10-13
Payer: COMMERCIAL

## 2021-10-13 DIAGNOSIS — Z23 NEED FOR INFLUENZA VACCINATION: Primary | ICD-10-CM

## 2021-10-13 PROCEDURE — 90471 IMMUNIZATION ADMIN: CPT

## 2021-10-13 PROCEDURE — 90682 RIV4 VACC RECOMBINANT DNA IM: CPT

## 2022-03-03 ENCOUNTER — OFFICE VISIT (OUTPATIENT)
Dept: FAMILY MEDICINE CLINIC | Facility: CLINIC | Age: 67
End: 2022-03-03
Payer: COMMERCIAL

## 2022-03-03 VITALS
WEIGHT: 209 LBS | RESPIRATION RATE: 18 BRPM | HEIGHT: 73 IN | SYSTOLIC BLOOD PRESSURE: 160 MMHG | HEART RATE: 102 BPM | BODY MASS INDEX: 27.7 KG/M2 | OXYGEN SATURATION: 98 % | TEMPERATURE: 98.6 F | DIASTOLIC BLOOD PRESSURE: 80 MMHG

## 2022-03-03 DIAGNOSIS — S39.012A STRAIN OF LUMBAR REGION, INITIAL ENCOUNTER: Primary | ICD-10-CM

## 2022-03-03 DIAGNOSIS — M62.838 MUSCLE SPASM: ICD-10-CM

## 2022-03-03 PROCEDURE — 99213 OFFICE O/P EST LOW 20 MIN: CPT | Performed by: NURSE PRACTITIONER

## 2022-03-03 PROCEDURE — 3725F SCREEN DEPRESSION PERFORMED: CPT | Performed by: NURSE PRACTITIONER

## 2022-03-03 RX ORDER — NAPROXEN 500 MG/1
500 TABLET ORAL 2 TIMES DAILY WITH MEALS
Qty: 30 TABLET | Refills: 5 | Status: SHIPPED | OUTPATIENT
Start: 2022-03-03 | End: 2022-04-12

## 2022-03-03 RX ORDER — BACLOFEN 10 MG/1
10 TABLET ORAL 3 TIMES DAILY
Qty: 30 TABLET | Refills: 0 | Status: SHIPPED | OUTPATIENT
Start: 2022-03-03 | End: 2022-03-28 | Stop reason: SDUPTHER

## 2022-03-03 NOTE — PROGRESS NOTES
Lucille Hernandez is a 61 y o  male who presents for evaluation of low back pain  The patient has had recurrent self limited episodes of low back pain in the past, previous osteoarthritis of lumbar spine and a previous herniated disc  Symptoms have been present for 3 days and are unchanged  Onset was related to / precipitated by a twisting movement and while playing with dog  The pain is located in the left lumbar area and does not radiate  The pain is described as aching and throbbing and occurs all day  He rates his pain as moderate  Symptoms are exacerbated by lifting and twisting  Symptoms are improved by ice and aspercreme  He has no other symptoms associated with the back pain  The patient has no "red flag" history indicative of complicated back pain       The following portions of the patient's history were reviewed and updated as appropriate: allergies, current medications, past family history, past medical history, past social history, past surgical history and problem list     Review of Systems  Pertinent items are noted in HPI     Vitals:    03/03/22 1053 03/03/22 1103   BP: (!) 190/82 160/80   BP Location: Left arm    Patient Position: Sitting    Cuff Size: Large    Pulse: 102    Resp: 18    Temp: 98 6 °F (37 °C)    SpO2: 98%    Weight: 94 8 kg (209 lb)    Height: 6' 1" (1 854 m)        Objective   Normal reflexes, gait, strength and negative straight-leg raise  Inspection and palpation: inspection of back is normal, left sided muscle tenderness, spasm  Muscle tone and ROM exam: full range of motion with pain  Neurological: normal     Assessment/Plan   back muscle strain  The case discussed with patient using patient centered shared decision making  The patient was counseled regarding instructions for management,-- risk factor reductions,-- prognosis,-- impressions,-- risks and benefits of treatment options,-- importance of compliance with treatment   I have reviewed the instructions with the patient, answering all questions to his satisfaction  Natural history and expected course discussed  Questions answered  Stretching exercises discussed  Short (2-4 day) period of relative rest recommended until acute symptoms improve  Heat to affected area as needed for local pain relief  NSAIDs per medication orders  Muscle relaxants per medication orders  Follow-up in 1 week  [facility]

## 2022-03-28 ENCOUNTER — OFFICE VISIT (OUTPATIENT)
Dept: FAMILY MEDICINE CLINIC | Facility: CLINIC | Age: 67
End: 2022-03-28
Payer: COMMERCIAL

## 2022-03-28 ENCOUNTER — APPOINTMENT (OUTPATIENT)
Dept: RADIOLOGY | Facility: CLINIC | Age: 67
End: 2022-03-28
Payer: COMMERCIAL

## 2022-03-28 VITALS
OXYGEN SATURATION: 98 % | BODY MASS INDEX: 27.25 KG/M2 | RESPIRATION RATE: 18 BRPM | DIASTOLIC BLOOD PRESSURE: 92 MMHG | HEIGHT: 73 IN | TEMPERATURE: 98.4 F | HEART RATE: 95 BPM | WEIGHT: 205.6 LBS | SYSTOLIC BLOOD PRESSURE: 140 MMHG

## 2022-03-28 DIAGNOSIS — Z13.0 SCREENING, DEFICIENCY ANEMIA, IRON: ICD-10-CM

## 2022-03-28 DIAGNOSIS — Z12.5 PROSTATE CANCER SCREENING: ICD-10-CM

## 2022-03-28 DIAGNOSIS — M62.838 MUSCLE SPASM: ICD-10-CM

## 2022-03-28 DIAGNOSIS — M54.16 LUMBAR RADICULOPATHY: Primary | ICD-10-CM

## 2022-03-28 DIAGNOSIS — M54.16 LUMBAR RADICULOPATHY: ICD-10-CM

## 2022-03-28 DIAGNOSIS — Z13.1 SCREENING FOR DIABETES MELLITUS: ICD-10-CM

## 2022-03-28 DIAGNOSIS — S39.012A STRAIN OF LUMBAR REGION, INITIAL ENCOUNTER: ICD-10-CM

## 2022-03-28 DIAGNOSIS — Z79.899 HIGH RISK MEDICATION USE: ICD-10-CM

## 2022-03-28 DIAGNOSIS — Z13.220 LIPID SCREENING: ICD-10-CM

## 2022-03-28 PROCEDURE — 72110 X-RAY EXAM L-2 SPINE 4/>VWS: CPT

## 2022-03-28 PROCEDURE — 1036F TOBACCO NON-USER: CPT | Performed by: NURSE PRACTITIONER

## 2022-03-28 PROCEDURE — 99213 OFFICE O/P EST LOW 20 MIN: CPT | Performed by: NURSE PRACTITIONER

## 2022-03-28 PROCEDURE — 3008F BODY MASS INDEX DOCD: CPT | Performed by: NURSE PRACTITIONER

## 2022-03-28 RX ORDER — PREDNISONE 20 MG/1
40 TABLET ORAL DAILY
Qty: 10 TABLET | Refills: 0 | Status: SHIPPED | OUTPATIENT
Start: 2022-03-28 | End: 2022-04-02

## 2022-03-28 RX ORDER — BACLOFEN 10 MG/1
10 TABLET ORAL 3 TIMES DAILY
Qty: 30 TABLET | Refills: 0 | Status: SHIPPED | OUTPATIENT
Start: 2022-03-28 | End: 2022-04-12

## 2022-03-28 NOTE — PROGRESS NOTES
Assessment/Plan:    1  Lumbar radiculopathy  -     XR spine lumbar minimum 4 views non injury; Future; Expected date: 03/28/2022  -     CBC and Platelet; Future  -     predniSONE 20 mg tablet; Take 2 tablets (40 mg total) by mouth daily for 5 days  -     Vitamin D 25 hydroxy; Future    2  Screening, deficiency anemia, iron  -     CBC and Platelet; Future    3  Screening for diabetes mellitus  -     Comprehensive metabolic panel; Future    4  High risk medication use  -     CBC and Platelet; Future  -     Comprehensive metabolic panel; Future  -     Vitamin D 25 hydroxy; Future    5  Prostate cancer screening  -     PSA, Total Screen; Future    6  Strain of lumbar region, initial encounter  -     baclofen 10 mg tablet; Take 1 tablet (10 mg total) by mouth 3 (three) times a day    7  Muscle spasm  -     baclofen 10 mg tablet; Take 1 tablet (10 mg total) by mouth 3 (three) times a day    8  Lipid screening  -     Lipid panel; Future    The case discussed with patient using patient centered shared decision making  The patient was counseled regarding instructions for management,-- risk factor reductions,-- prognosis,-- impressions,-- risks and benefits of treatment options,-- importance of compliance with treatment  I have reviewed the instructions with the patient, answering all questions to his satisfaction  Lumbar roll/support for car  Gentle stretching  rx per orders  Check XR  Consider PT  Recheck in 1 week or sooner for rapidly worsening    BMI Counseling: Body mass index is 27 13 kg/m²  The BMI is above normal  Nutrition recommendations include decreasing portion sizes, moderation in carbohydrate intake and increasing intake of lean protein  Exercise recommendations include exercising 3-5 times per week  Rationale for BMI follow-up plan is due to patient being overweight or obese  There are no Patient Instructions on file for this visit  Return in about 1 week (around 4/4/2022)      Subjective: Patient ID: Bobby Lopez is a 61 y o  male  Chief Complaint   Patient presents with    Leg Pain     pt c/o back of leg pain        Back Pain  This is a recurrent problem  The current episode started in the past 7 days  The problem occurs intermittently (worse after prolonged sitting)  The problem is unchanged  The pain is present in the gluteal  The quality of the pain is described as aching, burning and shooting  Radiates to: right thigh, calf  The pain is moderate  The symptoms are aggravated by sitting  Associated symptoms include leg pain  Pertinent negatives include no abdominal pain, bladder incontinence, bowel incontinence, chest pain, dysuria, fever, headaches, pelvic pain, perianal numbness, weakness or weight loss  He has tried NSAIDs for the symptoms  The treatment provided mild relief  The following portions of the patient's history were reviewed and updated as appropriate: allergies, current medications, past family history, past medical history, past social history, past surgical history and problem list     Review of Systems   Constitutional: Negative for fatigue, fever and weight loss  Respiratory: Negative  Cardiovascular: Negative  Negative for chest pain  Gastrointestinal: Negative for abdominal pain, blood in stool, bowel incontinence and constipation  Genitourinary: Negative for bladder incontinence, dysuria and pelvic pain  Musculoskeletal: Positive for back pain  Negative for gait problem  Neurological: Negative for dizziness, weakness and headaches           Current Outpatient Medications   Medication Sig Dispense Refill    ascorbic acid (VITAMIN C) 500 mg tablet Take 500 mg by mouth daily      aspirin (ECOTRIN LOW STRENGTH) 81 mg EC tablet Take 81 mg by mouth daily      atorvastatin (LIPITOR) 10 mg tablet Take 1 tablet (10 mg total) by mouth daily 30 tablet 1    cyanocobalamin (VITAMIN B-12) 1,000 mcg tablet Take by mouth daily      lisinopril-hydrochlorothiazide (PRINZIDE,ZESTORETIC) 20-12 5 MG per tablet TAKE 1 TABLET BY MOUTH EVERY DAY 30 tablet 6    omeprazole (PriLOSEC) 40 MG capsule Take 1 capsule (40 mg total) by mouth daily 30 capsule 6    baclofen 10 mg tablet Take 1 tablet (10 mg total) by mouth 3 (three) times a day 30 tablet 0    fluticasone (FLONASE) 50 mcg/act nasal spray 1 spray into each nostril daily 1 Bottle 0    naproxen (Naprosyn) 500 mg tablet Take 1 tablet (500 mg total) by mouth 2 (two) times a day with meals 30 tablet 5    predniSONE 20 mg tablet Take 2 tablets (40 mg total) by mouth daily for 5 days 10 tablet 0     No current facility-administered medications for this visit  Objective:    /92 (BP Location: Left arm, Patient Position: Sitting, Cuff Size: Large)   Pulse 95   Temp 98 4 °F (36 9 °C)   Resp 18   Ht 6' 1" (1 854 m)   Wt 93 3 kg (205 lb 9 6 oz)   SpO2 98%   BMI 27 13 kg/m²        Physical Exam  Vitals and nursing note reviewed  Constitutional:       General: He is not in acute distress  Appearance: Normal appearance  Cardiovascular:      Rate and Rhythm: Normal rate and regular rhythm  Pulses: Normal pulses  Heart sounds: Normal heart sounds  Pulmonary:      Effort: Pulmonary effort is normal       Breath sounds: Normal breath sounds  Abdominal:      Palpations: Abdomen is soft  Tenderness: There is no abdominal tenderness  Musculoskeletal:      Thoracic back: No deformity or spasms  Decreased range of motion  Back:       Right lower leg: No edema  Left lower leg: No edema  Skin:     Coloration: Skin is not pale  Neurological:      General: No focal deficit present  Mental Status: He is alert  Mental status is at baseline     Psychiatric:         Mood and Affect: Mood normal                 Georgianna Goodell, [facility]

## 2022-03-31 ENCOUNTER — TELEPHONE (OUTPATIENT)
Dept: FAMILY MEDICINE CLINIC | Facility: CLINIC | Age: 67
End: 2022-03-31

## 2022-04-01 ENCOUNTER — APPOINTMENT (OUTPATIENT)
Dept: LAB | Facility: CLINIC | Age: 67
End: 2022-04-01
Payer: COMMERCIAL

## 2022-04-01 DIAGNOSIS — M54.16 LUMBAR RADICULOPATHY: ICD-10-CM

## 2022-04-01 DIAGNOSIS — Z13.220 LIPID SCREENING: ICD-10-CM

## 2022-04-01 DIAGNOSIS — Z13.1 SCREENING FOR DIABETES MELLITUS: ICD-10-CM

## 2022-04-01 DIAGNOSIS — Z79.899 HIGH RISK MEDICATION USE: ICD-10-CM

## 2022-04-01 DIAGNOSIS — Z13.0 SCREENING, DEFICIENCY ANEMIA, IRON: ICD-10-CM

## 2022-04-01 DIAGNOSIS — Z12.5 PROSTATE CANCER SCREENING: ICD-10-CM

## 2022-04-01 LAB
25(OH)D3 SERPL-MCNC: 22.3 NG/ML (ref 30–100)
ALBUMIN SERPL BCP-MCNC: 4.2 G/DL (ref 3.5–5)
ALP SERPL-CCNC: 49 U/L (ref 46–116)
ALT SERPL W P-5'-P-CCNC: 68 U/L (ref 12–78)
ANION GAP SERPL CALCULATED.3IONS-SCNC: 5 MMOL/L (ref 4–13)
AST SERPL W P-5'-P-CCNC: 40 U/L (ref 5–45)
BILIRUB SERPL-MCNC: 1.23 MG/DL (ref 0.2–1)
BUN SERPL-MCNC: 24 MG/DL (ref 5–25)
CALCIUM SERPL-MCNC: 9.3 MG/DL (ref 8.3–10.1)
CHLORIDE SERPL-SCNC: 105 MMOL/L (ref 100–108)
CHOLEST SERPL-MCNC: 188 MG/DL
CO2 SERPL-SCNC: 31 MMOL/L (ref 21–32)
CREAT SERPL-MCNC: 1.19 MG/DL (ref 0.6–1.3)
ERYTHROCYTE [DISTWIDTH] IN BLOOD BY AUTOMATED COUNT: 14 % (ref 11.6–15.1)
GFR SERPL CREATININE-BSD FRML MDRD: 64 ML/MIN/1.73SQ M
GLUCOSE P FAST SERPL-MCNC: 90 MG/DL (ref 65–99)
HCT VFR BLD AUTO: 48.5 % (ref 36.5–49.3)
HDLC SERPL-MCNC: 64 MG/DL
HGB BLD-MCNC: 16 G/DL (ref 12–17)
LDLC SERPL CALC-MCNC: 99 MG/DL (ref 0–100)
MCH RBC QN AUTO: 28 PG (ref 26.8–34.3)
MCHC RBC AUTO-ENTMCNC: 33 G/DL (ref 31.4–37.4)
MCV RBC AUTO: 85 FL (ref 82–98)
NONHDLC SERPL-MCNC: 124 MG/DL
PLATELET # BLD AUTO: 267 THOUSANDS/UL (ref 149–390)
PMV BLD AUTO: 10.8 FL (ref 8.9–12.7)
POTASSIUM SERPL-SCNC: 3.9 MMOL/L (ref 3.5–5.3)
PROT SERPL-MCNC: 7 G/DL (ref 6.4–8.2)
PSA SERPL-MCNC: 1.2 NG/ML (ref 0–4)
RBC # BLD AUTO: 5.71 MILLION/UL (ref 3.88–5.62)
SODIUM SERPL-SCNC: 141 MMOL/L (ref 136–145)
TRIGL SERPL-MCNC: 125 MG/DL
WBC # BLD AUTO: 12.68 THOUSAND/UL (ref 4.31–10.16)

## 2022-04-01 PROCEDURE — 80053 COMPREHEN METABOLIC PANEL: CPT

## 2022-04-01 PROCEDURE — 82306 VITAMIN D 25 HYDROXY: CPT

## 2022-04-01 PROCEDURE — 36415 COLL VENOUS BLD VENIPUNCTURE: CPT

## 2022-04-01 PROCEDURE — 80061 LIPID PANEL: CPT

## 2022-04-01 PROCEDURE — 85027 COMPLETE CBC AUTOMATED: CPT

## 2022-04-01 PROCEDURE — G0103 PSA SCREENING: HCPCS

## 2022-04-05 ENCOUNTER — RA CDI HCC (OUTPATIENT)
Dept: OTHER | Facility: HOSPITAL | Age: 67
End: 2022-04-05

## 2022-04-05 NOTE — PROGRESS NOTES
[facility]  coding opportunities       Chart reviewed, no opportunity found: CHART REVIEWED, NO OPPORTUNITY FOUND        Patients Insurance        Commercial Insurance: Abram Dee

## 2022-04-12 ENCOUNTER — OFFICE VISIT (OUTPATIENT)
Dept: FAMILY MEDICINE CLINIC | Facility: CLINIC | Age: 67
End: 2022-04-12
Payer: COMMERCIAL

## 2022-04-12 VITALS
RESPIRATION RATE: 18 BRPM | HEIGHT: 73 IN | TEMPERATURE: 98.2 F | OXYGEN SATURATION: 97 % | BODY MASS INDEX: 26.98 KG/M2 | SYSTOLIC BLOOD PRESSURE: 140 MMHG | DIASTOLIC BLOOD PRESSURE: 78 MMHG | WEIGHT: 203.6 LBS | HEART RATE: 107 BPM

## 2022-04-12 DIAGNOSIS — G89.29 CHRONIC BACK PAIN GREATER THAN 3 MONTHS DURATION: ICD-10-CM

## 2022-04-12 DIAGNOSIS — M54.17 LUMBOSACRAL RADICULOPATHY AT L5: ICD-10-CM

## 2022-04-12 DIAGNOSIS — Z00.00 HEALTH CARE MAINTENANCE: Primary | ICD-10-CM

## 2022-04-12 DIAGNOSIS — D72.828 OTHER ELEVATED WHITE BLOOD CELL (WBC) COUNT: ICD-10-CM

## 2022-04-12 DIAGNOSIS — M54.9 CHRONIC BACK PAIN GREATER THAN 3 MONTHS DURATION: ICD-10-CM

## 2022-04-12 DIAGNOSIS — Z71.2 ENCOUNTER TO DISCUSS TEST RESULTS: ICD-10-CM

## 2022-04-12 PROCEDURE — 1036F TOBACCO NON-USER: CPT | Performed by: NURSE PRACTITIONER

## 2022-04-12 PROCEDURE — 99396 PREV VISIT EST AGE 40-64: CPT | Performed by: NURSE PRACTITIONER

## 2022-04-12 PROCEDURE — 3008F BODY MASS INDEX DOCD: CPT | Performed by: NURSE PRACTITIONER

## 2022-04-12 NOTE — PROGRESS NOTES
FAMILY PRACTICE HEALTH MAINTENANCE OFFICE VISIT  [facility]    NAME: Ashley Le  AGE: 61 y o  SEX: male  : 1955     DATE: 2022    Assessment and Plan     1  Health care maintenance    2  Chronic back pain greater than 3 months duration  -     MRI lumbar spine wo contrast; Future; Expected date: 2022    3  Other elevated white blood cell (WBC) count  -     MRI lumbar spine wo contrast; Future; Expected date: 2022    4  Lumbosacral radiculopathy at L5  -     MRI lumbar spine wo contrast; Future; Expected date: 2022    5  Encounter to discuss test results    The case discussed with patient using patient centered shared decision making  The patient was counseled regarding instructions for management,-- risk factor reductions,-- prognosis,-- impressions,-- risks and benefits of treatment options,-- importance of compliance with treatment  I have reviewed the instructions with the patient, answering all questions to his satisfaction  Patient would like to pursue MRI in setting of persistent LBP with sciatica  Will schedule with Dr Yarely Diego in 6 weeks    Will discuss colonoscopy with wife  Likely to schedule in 6 weeks    rto 6 weeks recheck    · Patient Counseling:   · Nutrition: Stressed importance of a well balanced diet, moderation of sodium/saturated fat, caloric balance and sufficient intake of fiber  · Exercise: Stressed the importance of regular exercise with a goal of 150 minutes per week  · Dental Health: Discussed daily flossing and brushing and regular dental visits   · Injury Prevention: Discussed Safety Belts, Safety Helmets, and Smoke Detectors    · Immunizations reviewed: Risks and Benefits discussed and Declined recommended vaccinations  · Discussed benefits of:  Colon Cancer Screening   BMI Counseling: Body mass index is 26 86 kg/m²  Discussed with patient's BMI with him   The BMI is above normal  Exercise recommendations include joining a gym  Return in about 6 weeks (around 5/24/2022)  Chief Complaint     Chief Complaint   Patient presents with    Annual Exam       History of Present Illness     Pt comes for PE, result review    Back pain, sciatica still waxing and waning--severe at times    Lab results acceptable except new leukocytosis          Well Adult Physical   Patient here for a comprehensive physical exam       Diet and Physical Activity  Diet: well balanced diet  Exercise: occasionally      Depression Screen  PHQ-2/9 Depression Screening            General Health  Hearing: Normal:  bilateral  Vision: no vision problems  Dental: regular dental visits    Reproductive Health  No issues       The following portions of the patient's history were reviewed and updated as appropriate: allergies, current medications, past family history, past medical history, past social history, past surgical history and problem list     Review of Systems     Review of Systems   Constitutional: Positive for fatigue  Respiratory: Negative  Cardiovascular: Negative  Musculoskeletal: Positive for back pain and gait problem  Pain, numbness alternating b/n left and right legs     Psychiatric/Behavioral: Negative  All other systems reviewed and are negative        Past Medical History     Past Medical History:   Diagnosis Date    Acute pansinusitis     Bicipital tendinitis of left shoulder     GERD (gastroesophageal reflux disease)     Hyperlipidemia     Hypertension     Lateral epicondylitis     Noninfectious dermatoses of eyelid     Pneumonia     Rotator cuff tendinitis     Sciatica     Skin lesion        Past Surgical History     Past Surgical History:   Procedure Laterality Date    EPIDURAL BLOCK INJECTION N/A 6/13/2018    Procedure: C6 C7 Cervical Epidural Steroid Injection;  Surgeon: Tasha Nogueira MD;  Location[facility] MAIN OR;  Service: Pain Management     EPIDURAL BLOCK INJECTION N/A 7/13/2018    Procedure: C6 C7 Cervical Epidural Steroid Injection (07830); Surgeon: Chrissy Morgan MD;  Location: [facility] MAIN OR;  Service: Pain Management     EPIDURAL BLOCK INJECTION N/A 10/12/2018    Procedure: C6 C7 Cervical Epidural Steroid Injection (99893); Surgeon: Chrissy Morgan MD;  Location: [facility] MAIN OR;  Service: Pain Management     EPIDURAL BLOCK INJECTION N/A 5/31/2019    Procedure: C6 C7 Cervical Epidural Steroid Injection (78219);   Surgeon: Chrissy Morgan MD;  Location: [facility] MAIN OR;  Service: Pain Management        Social History     Social History     Socioeconomic History    Marital status: /Civil Union     Spouse name: None    Number of children: None    Years of education: None    Highest education level: None   Occupational History    None   Tobacco Use    Smoking status: Former Smoker    Smokeless tobacco: Never Used   Substance and Sexual Activity    Alcohol use: No    Drug use: None    Sexual activity: None   Other Topics Concern    None   Social History Narrative    None     Social Determinants of Health     Financial Resource Strain: Not on file   Food Insecurity: Not on file   Transportation Needs: Not on file   Physical Activity: Not on file   Stress: Not on file   Social Connections: Not on file   Intimate Partner Violence: Not on file   Housing Stability: Not on file       Family History     Family History   Problem Relation Age of Onset    No Known Problems Mother     No Known Problems Father        Current Medications       Current Outpatient Medications:     ascorbic acid (VITAMIN C) 500 mg tablet, Take 500 mg by mouth daily, Disp: , Rfl:     aspirin (ECOTRIN LOW STRENGTH) 81 mg EC tablet, Take 81 mg by mouth daily, Disp: , Rfl:     atorvastatin (LIPITOR) 10 mg tablet, Take 1 tablet (10 mg total) by mouth daily, Disp: 30 tablet, Rfl: 1    cyanocobalamin (VITAMIN B-12) 1,000 mcg tablet, Take by mouth daily, Disp: , Rfl:    lisinopril-hydrochlorothiazide (PRINZIDE,ZESTORETIC) 20-12 5 MG per tablet, TAKE 1 TABLET BY MOUTH EVERY DAY, Disp: 30 tablet, Rfl: 6    omeprazole (PriLOSEC) 40 MG capsule, Take 1 capsule (40 mg total) by mouth daily, Disp: 30 capsule, Rfl: 6     Allergies     No Known Allergies    Objective     /78 (BP Location: Left arm, Patient Position: Sitting, Cuff Size: Large)   Pulse (!) 107   Temp 98 2 °F (36 8 °C)   Resp 18   Ht 6' 1" (1 854 m)   Wt 92 4 kg (203 lb 9 6 oz)   SpO2 97%   BMI 26 86 kg/m²      Physical Exam  Vitals and nursing note reviewed  Constitutional:       General: He is not in acute distress  Appearance: Normal appearance  He is well-developed  HENT:      Head: Normocephalic and atraumatic  Eyes:      Conjunctiva/sclera: Conjunctivae normal       Pupils: Pupils are equal, round, and reactive to light  Neck:      Thyroid: No thyromegaly  Vascular: No carotid bruit  Cardiovascular:      Rate and Rhythm: Normal rate and regular rhythm  Pulses: Normal pulses  Heart sounds: Normal heart sounds  No murmur heard  Pulmonary:      Effort: Pulmonary effort is normal       Breath sounds: Normal breath sounds  Abdominal:      General: Bowel sounds are normal  There is no abdominal bruit  Palpations: Abdomen is soft  Tenderness: There is no abdominal tenderness  Musculoskeletal:      Lumbar back: Bony tenderness present  No deformity or spasms  Decreased range of motion  Right lower leg: No edema  Left lower leg: No edema  Lymphadenopathy:      Cervical: No cervical adenopathy  Skin:     General: Skin is warm and dry  Coloration: Skin is not pale  Neurological:      General: No focal deficit present  Mental Status: He is alert  Mental status is at baseline     Psychiatric:         Mood and Affect: Mood normal          Behavior: Behavior normal            No exam data present        [facility]

## 2022-05-10 ENCOUNTER — HOSPITAL ENCOUNTER (OUTPATIENT)
Dept: RADIOLOGY | Facility: HOSPITAL | Age: 67
Discharge: HOME | End: 2022-05-10
Payer: COMMERCIAL

## 2022-05-10 DIAGNOSIS — M54.9 CHRONIC BACK PAIN GREATER THAN 3 MONTHS DURATION: ICD-10-CM

## 2022-05-10 DIAGNOSIS — M54.17 LUMBOSACRAL RADICULOPATHY AT L5: ICD-10-CM

## 2022-05-10 DIAGNOSIS — G89.29 CHRONIC BACK PAIN GREATER THAN 3 MONTHS DURATION: ICD-10-CM

## 2022-05-10 DIAGNOSIS — D72.828 OTHER ELEVATED WHITE BLOOD CELL (WBC) COUNT: ICD-10-CM

## 2022-05-10 PROCEDURE — 72148 MRI LUMBAR SPINE W/O DYE: CPT

## 2022-05-10 PROCEDURE — G1004 CDSM NDSC: HCPCS

## 2022-05-24 ENCOUNTER — OFFICE VISIT (OUTPATIENT)
Dept: FAMILY MEDICINE CLINIC | Facility: CLINIC | Age: 67
End: 2022-05-24
Payer: COMMERCIAL

## 2022-05-24 ENCOUNTER — TELEPHONE (OUTPATIENT)
Dept: ADMINISTRATIVE | Facility: OTHER | Age: 67
End: 2022-05-24

## 2022-05-24 VITALS
WEIGHT: 206.4 LBS | TEMPERATURE: 98.6 F | SYSTOLIC BLOOD PRESSURE: 122 MMHG | HEART RATE: 102 BPM | BODY MASS INDEX: 27.35 KG/M2 | HEIGHT: 73 IN | DIASTOLIC BLOOD PRESSURE: 78 MMHG | RESPIRATION RATE: 16 BRPM

## 2022-05-24 DIAGNOSIS — I10 BENIGN ESSENTIAL HYPERTENSION: ICD-10-CM

## 2022-05-24 DIAGNOSIS — R35.0 URINARY FREQUENCY: ICD-10-CM

## 2022-05-24 DIAGNOSIS — D72.820 LYMPHOCYTOSIS: ICD-10-CM

## 2022-05-24 DIAGNOSIS — N28.1 BILATERAL RENAL CYSTS: ICD-10-CM

## 2022-05-24 DIAGNOSIS — M54.16 LUMBAR RADICULOPATHY, CHRONIC: Primary | ICD-10-CM

## 2022-05-24 DIAGNOSIS — R73.03 PREDIABETES: ICD-10-CM

## 2022-05-24 LAB
SL AMB  POCT GLUCOSE, UA: NORMAL
SL AMB LEUKOCYTE ESTERASE,UA: NORMAL
SL AMB POCT BILIRUBIN,UA: NORMAL
SL AMB POCT BLOOD,UA: NORMAL
SL AMB POCT CLARITY,UA: 1.01
SL AMB POCT COLOR,UA: YELLOW
SL AMB POCT HEMOGLOBIN AIC: 6.1 (ref ?–6.5)
SL AMB POCT KETONES,UA: NORMAL
SL AMB POCT NITRITE,UA: NORMAL
SL AMB POCT PH,UA: NORMAL
SL AMB POCT SPECIFIC GRAVITY,UA: 6.5
SL AMB POCT URINE PROTEIN: NORMAL
SL AMB POCT UROBILINOGEN: NORMAL

## 2022-05-24 PROCEDURE — 83036 HEMOGLOBIN GLYCOSYLATED A1C: CPT | Performed by: FAMILY MEDICINE

## 2022-05-24 PROCEDURE — 3008F BODY MASS INDEX DOCD: CPT | Performed by: NURSE PRACTITIONER

## 2022-05-24 PROCEDURE — 99214 OFFICE O/P EST MOD 30 MIN: CPT | Performed by: FAMILY MEDICINE

## 2022-05-24 PROCEDURE — 81003 URINALYSIS AUTO W/O SCOPE: CPT | Performed by: FAMILY MEDICINE

## 2022-05-24 NOTE — TELEPHONE ENCOUNTER
----- Message from Lia Muhammad, [address] [address] sent at 5/24/2022 10:05 AM EDT -----  Regarding: covid booster  05/24/22 10:05 AM    Hello, our patient Kinga Felder has had covid booster completed/performed  Please assist in updating the [facility] [place] The date of service is 2021      Thank you,  Susu Tinoco, MIKHAIL BACON

## 2022-05-24 NOTE — LETTER
Vaccination Request Form: [id number] aka SARS-CoV-2 ([employer] or [employer])      Date Requested: 22  Patient: Amalia Ivory  Patient : 1955   Referring Provider: JOHN Tolbert       The above patient has informed us that they have had their   most recent COVID-19 aka SARS-CoV-2 ([employer] or [employer] or [employer]) administered at your facility  Please   complete this form and attach all corresponding documentation  Date of Vaccine(s) Given  ______________________________    Lot Number(s) _______________________________________    Manufacture(s) ______________________________________    Dose Amount (s) _____________________________________    Expiration Date(s) ____________________________________    Comments __________________________________________________________  ____________________________________________________________________  ____________________________________________________________________  ____________________________________________________________________    Administering Facility  ________________________________________________    Vaccine Administered By (print name) ___________________________________      Form Completed By (print name) _______________________________________      Signature ___________________________________________________________      These reports are needed for  compliance  Please fax this completed form and a copy of the Vaccine Document(s) to our office located at [place] [address] as soon as possible to [phone] attention Celestino Ser: Phone [phone]    We thank you for your assistance in treating our mutual patient

## 2022-05-24 NOTE — PROGRESS NOTES
Assessment/Plan:    1  Lumbar radiculopathy, chronic    2  Lymphocytosis  -     CBC and Platelet; Future  -     POCT hemoglobin A1c    3  Benign essential hypertension  -     POCT urine dip auto non-scope    4  Urinary frequency  -     Ambulatory Referral to Urology; Future    5  Prediabetes  -     Ambulatory Referral to Urology; Future    6  Bilateral renal cysts  -     Ambulatory Referral to Urology; Future    7  BMI 26 0-26 9,adult         Subjective:      Patient ID: Ignacio Griffin is a 61 y o  male  Chief Complaint   Patient presents with    Follow-up     On back pain and sciatica , review mri results -back pain is gone patient feeling better        HPI  Follow-up on back pain w radiculopathy and recent test results  MRI LS spine w multi-level degenerative changes, b/l renal cysts  TXd by NP w  Baclofen, naproxen which was then d/c'd and changed to prednisone  Pt states back is gone and patient is feeling better  Other recent resuts  WBC elevated   RsS5C=8 1%  Vit D low=22 3  BP in range  UA negative    The following portions of the patient's history were reviewed and updated as appropriate: allergies, current medications, past family history, past medical history, past social history, past surgical history and problem list   Past Medical History:   Diagnosis Date    Acute pansinusitis     Bicipital tendinitis of left shoulder     GERD (gastroesophageal reflux disease)     Hyperlipidemia     Hypertension     Lateral epicondylitis     Noninfectious dermatoses of eyelid     Pneumonia     Rotator cuff tendinitis     Sciatica     Skin lesion      Past Surgical History:   Procedure Laterality Date    EPIDURAL BLOCK INJECTION N/A 6/13/2018    Procedure: C6 C7 Cervical Epidural Steroid Injection;  Surgeon: Alyssa Mullins MD;  Location: [place] [address] MAIN OR;  Service: Pain Management     EPIDURAL BLOCK INJECTION N/A 7/13/2018    Procedure: C6 C7 Cervical Epidural Steroid Injection (09115);   Surgeon: Wan Krishnamurthy Conchita Vela MD;  Location: [place] [address] MAIN OR;  Service: Pain Management     EPIDURAL BLOCK INJECTION N/A 10/12/2018    Procedure: C6 C7 Cervical Epidural Steroid Injection (67124); Surgeon: Isidoro Alexander MD;  Location[facility] MAIN OR;  Service: Pain Management     EPIDURAL BLOCK INJECTION N/A 5/31/2019    Procedure: C6 C7 Cervical Epidural Steroid Injection (74647); Surgeon: Isidoro Alexander MD;  Location: [facility] MAIN OR;  Service: Pain Management      Review of Systems    Per hpi    Current Outpatient Medications   Medication Sig Dispense Refill    ascorbic acid (VITAMIN C) 500 mg tablet Take 500 mg by mouth daily      atorvastatin (LIPITOR) 10 mg tablet Take 1 tablet (10 mg total) by mouth daily 30 tablet 1    cyanocobalamin (VITAMIN B-12) 1,000 mcg tablet Take by mouth daily      lisinopril-hydrochlorothiazide (PRINZIDE,ZESTORETIC) 20-12 5 MG per tablet TAKE 1 TABLET BY MOUTH EVERY DAY 30 tablet 6    omeprazole (PriLOSEC) 40 MG capsule Take 1 capsule (40 mg total) by mouth daily 30 capsule 6    aspirin (ECOTRIN LOW STRENGTH) 81 mg EC tablet Take 81 mg by mouth daily       No current facility-administered medications for this visit  Objective:    /78 (BP Location: Left arm, Patient Position: Sitting, Cuff Size: Large)   Pulse 102   Temp 98 6 °F (37 °C)   Resp 16   Ht 6' 1" (1 854 m)   Wt 93 6 kg (206 lb 6 4 oz)   BMI 27 23 kg/m²        Physical Exam  Vitals and nursing note reviewed  Constitutional:       General: He is not in acute distress  Appearance: Normal appearance  Cardiovascular:      Rate and Rhythm: Normal rate and regular rhythm  Pulmonary:      Effort: Pulmonary effort is normal  No respiratory distress  Breath sounds: Normal breath sounds  Abdominal:      General: Bowel sounds are normal       Palpations: Abdomen is soft  Tenderness: There is no abdominal tenderness  There is no right CVA tenderness or left CVA tenderness     Musculoskeletal:      Cervical back: Neck supple  Comments: No sig tenderness   Skin:     General: Skin is warm and dry  Findings: No rash  Neurological:      Mental Status: He is alert and oriented to person, place, and time           Kassidy Aguilera MD

## 2022-05-25 NOTE — TELEPHONE ENCOUNTER
Upon review of the In Basket request and the patient's chart, initial outreach has been made via fax, please see Contacts section for details       Thank you  Tamara Paz MA

## 2022-05-31 NOTE — TELEPHONE ENCOUNTER
Upon review of the In Basket request we were able to locate, review, and update the patient chart as requested for Immunization(s) covid 19  Any additional questions or concerns should be emailed to the Practice Liaisons via [email or web address] email, please do not reply via In Basket      Thank you  James Patel MA

## 2022-06-20 ENCOUNTER — OFFICE VISIT (OUTPATIENT)
Dept: PAIN MEDICINE | Facility: CLINIC | Age: 67
End: 2022-06-20
Payer: COMMERCIAL

## 2022-06-20 VITALS
DIASTOLIC BLOOD PRESSURE: 80 MMHG | BODY MASS INDEX: 27.04 KG/M2 | RESPIRATION RATE: 19 BRPM | SYSTOLIC BLOOD PRESSURE: 149 MMHG | TEMPERATURE: 98.2 F | HEART RATE: 78 BPM | HEIGHT: 73 IN | WEIGHT: 204 LBS

## 2022-06-20 DIAGNOSIS — G89.4 CHRONIC PAIN SYNDROME: Primary | ICD-10-CM

## 2022-06-20 DIAGNOSIS — G89.29 CHRONIC RIGHT-SIDED LOW BACK PAIN WITH RIGHT-SIDED SCIATICA: ICD-10-CM

## 2022-06-20 DIAGNOSIS — M48.061 SPINAL STENOSIS OF LUMBAR REGION WITHOUT NEUROGENIC CLAUDICATION: ICD-10-CM

## 2022-06-20 DIAGNOSIS — M54.41 CHRONIC RIGHT-SIDED LOW BACK PAIN WITH RIGHT-SIDED SCIATICA: ICD-10-CM

## 2022-06-20 DIAGNOSIS — M54.16 LUMBAR RADICULOPATHY: ICD-10-CM

## 2022-06-20 PROCEDURE — 1036F TOBACCO NON-USER: CPT | Performed by: ANESTHESIOLOGY

## 2022-06-20 PROCEDURE — 99214 OFFICE O/P EST MOD 30 MIN: CPT | Performed by: ANESTHESIOLOGY

## 2022-06-20 PROCEDURE — 3008F BODY MASS INDEX DOCD: CPT | Performed by: ANESTHESIOLOGY

## 2022-06-20 NOTE — PROGRESS NOTES
Pain Medicine Follow-Up Note    Assessment:  1  Chronic pain syndrome    2  Chronic right-sided low back pain with right-sided sciatica    3  Spinal stenosis of lumbar region without neurogenic claudication    4  Lumbar radiculopathy        Plan:  My impressions and treatment recommendations were discussed in detail with the patient who verbalized understanding and had no further questions  The patient reports that he had a 3 week history of low back pain and right lower extremity radiculopathy and what appears to be the right S1 distribution  He did use a steroid by mouth and reports that his symptoms resolved after that  He is currently pain-free  He did have a new MRI of the lumbar spine which I did review with him at today's visit  He does have spinal stenosis  I did mention to the patient that should his pain return, I would be happy to perform interventional pain procedures on him in the future  The patient verbalized understanding  He is not interested in undergoing another course of physical therapy at this time  Follow-up is planned on an as-needed basis  Discharge instructions were provided  I personally saw and examined the patient and I agree with the above discussed plan of care  History of Present Illness:    Naresh Vieyra is a 61 y o  male who presents to [facility] for interval re-evaluation of the above stated pain complaints  The patient has a past medical and chronic pain history as outlined in the assessment section  He was last seen on February 11, 2020  At today's office visit, the patient's pain score 0/10 on the verbal numerical pain rating scale  The patient states that his pain is primarily in the low back and right lower extremity  His pain is worse in the morning, evening, and night  His pain is constant in nature  He reports the quality of his pain as pins and needles    He states that these symptoms only lasted him for about 3 weeks in April 2022  His symptoms have completely resolved after a steroid pack prescribed to him orally  Other than as stated above, the patient denies any interval changes in medications, medical condition, mental condition, symptoms, or allergies since the last office visit  Review of Systems:    Review of Systems   Constitutional: Negative for unexpected weight change  HENT: Negative for ear pain  Eyes: Negative for visual disturbance  Respiratory: Negative for shortness of breath and wheezing  Gastrointestinal: Negative for abdominal pain  Musculoskeletal: Positive for back pain and gait problem  Difficulty ROM, joint stifness   Neurological: Negative for weakness and numbness  Psychiatric/Behavioral: Negative for decreased concentration  Patient Active Problem List   Diagnosis    Benign essential hypertension    Esophageal reflux    Cervical spinal stenosis    Degeneration of C5-C6 intervertebral disc    Cervicalgia    Chronic pain syndrome    Cervical radiculitis       Past Medical History:   Diagnosis Date    Acute pansinusitis     Bicipital tendinitis of left shoulder     GERD (gastroesophageal reflux disease)     Hyperlipidemia     Hypertension     Lateral epicondylitis     Noninfectious dermatoses of eyelid     Pneumonia     Rotator cuff tendinitis     Sciatica     Skin lesion        Past Surgical History:   Procedure Laterality Date    EPIDURAL BLOCK INJECTION N/A 6/13/2018    Procedure: C6 C7 Cervical Epidural Steroid Injection;  Surgeon: Erasmo Patricia MD;  Location: [facility] MAIN OR;  Service: Pain Management     EPIDURAL BLOCK INJECTION N/A 7/13/2018    Procedure: C6 C7 Cervical Epidural Steroid Injection (11274); Surgeon: Erasmo Patricia MD;  Location: [facility] MAIN OR;  Service: Pain Management     EPIDURAL BLOCK INJECTION N/A 10/12/2018    Procedure: C6 C7 Cervical Epidural Steroid Injection (25129);   Surgeon: Erasmo Patricia MD;  Location: [facility] MAIN OR;  Service: Pain Management     EPIDURAL BLOCK INJECTION N/A 5/31/2019    Procedure: C6 C7 Cervical Epidural Steroid Injection (91980); Surgeon: Betzy Rivera MD;  Location: [facility] MAIN OR;  Service: Pain Management        Family History   Problem Relation Age of Onset    No Known Problems Mother     No Known Problems Father        Social History     Occupational History    Not on file   Tobacco Use    Smoking status: Former Smoker    Smokeless tobacco: Never Used   Vaping Use    Vaping Use: Never used   Substance and Sexual Activity    Alcohol use: No    Drug use: Never    Sexual activity: Yes     Partners: Female         Current Outpatient Medications:     ascorbic acid (VITAMIN C) 500 mg tablet, Take 500 mg by mouth daily, Disp: , Rfl:     aspirin (ECOTRIN LOW STRENGTH) 81 mg EC tablet, Take 81 mg by mouth daily, Disp: , Rfl:     atorvastatin (LIPITOR) 10 mg tablet, Take 1 tablet (10 mg total) by mouth daily, Disp: 30 tablet, Rfl: 1    cyanocobalamin (VITAMIN B-12) 1,000 mcg tablet, Take by mouth daily, Disp: , Rfl:     lisinopril-hydrochlorothiazide (PRINZIDE,ZESTORETIC) 20-12 5 MG per tablet, TAKE 1 TABLET BY MOUTH EVERY DAY, Disp: 30 tablet, Rfl: 6    omeprazole (PriLOSEC) 40 MG capsule, Take 1 capsule (40 mg total) by mouth daily, Disp: 30 capsule, Rfl: 6    No Known Allergies    Physical Exam:    /80   Pulse 78   Temp 98 2 °F (36 8 °C)   Resp 19   Ht 6' 1" (1 854 m)   Wt 92 5 kg (204 lb)   BMI 26 91 kg/m²     Constitutional:normal, well developed, well nourished, alert, in no distress and non-toxic and no overt pain behavior    Eyes:anicteric  HEENT:grossly intact  Neck:supple, symmetric, trachea midline and no masses   Pulmonary:even and unlabored  Cardiovascular:No edema or pitting edema present  Skin:Normal without rashes or lesions and well hydrated  Psychiatric:Mood and affect appropriate  Neurologic:Cranial Nerves II-XII grossly intact  Musculoskeletal:normal      Imaging      MRI lumbar spine wo contrast    Status: Final result     PACS Images     Show images for MRI lumbar spine wo contrast    Study Result    Narrative & Impression   MRI LUMBAR SPINE WITHOUT CONTRAST     INDICATION: Chronic back pain, radiculopathy      COMPARISON:  X-ray lumbar spine 3/28/2022     TECHNIQUE:  Sagittal T1, sagittal T2, sagittal inversion recovery, axial T1 and axial T2, coronal T2     IMAGE QUALITY:  Diagnostic     FINDINGS:     VERTEBRAL BODIES:  There are 5 lumbar type vertebral bodies  No disc bulge  Moderate bilateral facet arthropathy  No canal or foraminal stenosis      Mild Modic type I posterior endplate signal change at L2-3  Mixed Modic type I/II endplate degenerative change at level L5-S1  There is L4 vertebral body hemangioma      SACRUM:  Normal signal within the sacrum  No evidence of insufficiency or stress fracture      DISTAL CORD AND CONUS:  Normal size and signal within the distal cord and conus      PARASPINAL SOFT TISSUES:  Paraspinal soft tissues are unremarkable      Right kidney upper pole large cyst   Smaller T2 hyperintense foci in both kidneys, statistically favoring benign cysts      LOWER THORACIC DISC SPACES:  Normal disc height and signal   No disc herniation, canal stenosis or foraminal narrowing      LUMBAR DISC SPACES:     L1-L2:  No disc bulge  No canal or foraminal stenosis      L2-L3:  Large central disc protrusion  Moderate bilateral facet arthropathy  There is moderate left lateral recess narrowing with possible impact on left L3 nerve root  Mild to moderate canal stenosis      L3-L4:  Disc bulge  Moderate bilateral facet arthropathy  Mild canal stenosis    Right greater than left mild bilateral foraminal stenosis      L4-L5:  Disc bulge, moderate bilateral facet arthropathy and ligamentum flavum thickening in conjunction with mildly prominent posterior epidural fat resulting in severe lateral recesses and canal stenosis  Right greater than left moderate bilateral   foraminal narrowing      L5-S1:  Disc bulge and superimposed right foraminal disc protrusion  Moderate bilateral facet arthropathy  Mild canal stenosis  Mild right foraminal narrowing      IMPRESSION:     1   Lumbar spine degenerative change worse at levels L4-5 and L2-3, as detailed      2  Severe bilateral lateral recesses and canal stenosis at level L4-5      3  Moderate left lateral recess narrowing and mild to moderate canal stenosis at L2-3  There is possible impact on left L3 nerve root      4   Multilevel mild to moderate degree foraminal narrowing described above         Workstation performed: [id number]          Imaging    MRI lumbar spine wo contrast (Order: [id number]) - 5/10/2022    Result History    MRI lumbar spine wo contrast (Order #[id number]) on 5/11/2022 - Order Result History Report    Order Report     Order Details    Order Questions    Question Answer   What is the patient's sedation requirement? No Sedation   Release to patient through Mychart Immediate   Is order priority selected as STAT? No   Exam reason chronic back pain with radiculopathy, leukocytosis   Note: Enter reason for exam            Result Information    Status Priority Source   Final result (5/11/2022 10:49 AM) Routine      Reason for Exam    chronic back pain with radiculopathy, leukocytosis; Low back pain, symptoms persist with > 6wks conservative treatment; chronic back pain with radiculopathy, leukocytosis   Dx: Chronic back pain greater than 3 months duration [M54 9, G89 29 (ICD-10-CM)]; Other elevated white blood cell (WBC) count [J42 618 (ICD-10-CM)];  Lumbosacral radiculopathy at L5 [M54 17 (ICD-10-CM)]     All Reviewers List    Regenia Needle on 5/24/2022  6:32 PM       MRI lumbar spine wo contrast: Patient Communication     Add Comments   Seen       Signed by    Signed Date/Time  Phone Pager   [address], [address] [facility] 5/11/2022 10:49 [phone]      Exam Information    Status Exam Begun  Exam Ended  Performing Tech   Final [99] 5/10/2022 07:16 5/10/2022 07:36 Pina Bhatti     Screening Form Questions     important suggestion  Questionnaires are displayed in the order they were answered  Answer Comment   What are your symptoms? Back pain    Do you have a cardiac pacemaker or internal defibrillator? NA    Please list /make/model:     Have you had any HEART surgery? None    Please list make/model:     Heart surgery: If "other", please describe:     Have you had any BRAIN surgery? None    Please list  or describe implant:     Brain surgery: If "other", please describe:     Have you had any EYE surgery? None    Eye surgery: If "other", please describe:     Have you ever injured your eyes with metal or metal fragments (grinding,metallic slivers)? No    Eye injury: Please describe:     Have you had any EAR surgery? None    Ear surgery: If "other", please describe:      Do you have an electronic "stimulation" device? None    Please provide  information:     Have you had any abdominal or pelvic surgery? No    Have you had any of the following abdominal or pelvic surgeries:     Abdominal/pelvic surgery: If "other", please describe:     Do you have any ORTHOPEDIC implants/devices? None    Do you have the following: Tattoos (eyeliner included)    Do you have liver disease? None    Do you have kidney disease? None    Have you had a problem with a previous MRI? No    Does the patient require pre-medication? Do you have a personal history of cancer? None    If "other", please specify:       Are you wearing medication patches?   No    Are you wearing any of the following: None    Did the patient provide this information? Yes    Who provided this information (if not the patient)?      Relationship to the patient:     Contact number:     MRI STAFF ONLY- Prior imaging reviewed in PACS (initials): clau    MRI STAFF ONLY- Epic chart reviewed (initials): clau    MRI STAFF ONLY: The patient was scheduled to receive MRI contrast and has received the Medication Guide  No contrast given      External Results Report    Open External Results Report      Encounter    View Encounter               Patient Care Timeline    No data selected in time range    Pre-op Summary    Pre-op             Recovery Summary    Recovery               Routing History    None         No orders to display         No orders of the defined types were placed in this encounter

## 2022-06-29 PROBLEM — R73.03 PREDIABETES: Status: ACTIVE | Noted: 2022-06-29

## 2022-06-29 PROBLEM — R35.0 URINARY FREQUENCY: Status: ACTIVE | Noted: 2022-06-29

## 2022-06-29 PROBLEM — N28.1 BILATERAL RENAL CYSTS: Status: ACTIVE | Noted: 2022-06-29

## 2022-06-29 PROBLEM — D72.820 LYMPHOCYTOSIS: Status: ACTIVE | Noted: 2022-06-29

## 2022-06-29 PROBLEM — M54.16 LUMBAR RADICULOPATHY, CHRONIC: Status: ACTIVE | Noted: 2022-06-29

## 2022-07-22 ENCOUNTER — TELEPHONE (OUTPATIENT)
Dept: PAIN MEDICINE | Facility: MEDICAL CENTER | Age: 67
End: 2022-07-22

## 2022-07-22 NOTE — TELEPHONE ENCOUNTER
Patient wife called stating  is in a lot of pain she requesting if procedure can be scheduled for  as discussed at last visit if pain increased  Please advise     Patient can be reached at [phone]   TY

## 2022-07-22 NOTE — TELEPHONE ENCOUNTER
S/W Radha Rodriguez  Advised her of the same  She stated yes it is the same pain in his neck and his symptoms are the same  He takes ASA 81 mg q 2-3 days and takes it on his own  She stated no one prescribes it

## 2022-07-22 NOTE — TELEPHONE ENCOUNTER
If he is currently having pain in his neck and his symptoms are the same as he was prior to the previous injections, I feel it reasonable to repeat the cervical epidural steroid injection

## 2022-07-22 NOTE — TELEPHONE ENCOUNTER
S/W Leti Su wife as per LAUREN  She stated the pt seen AS on 6/20/22 and was told if the pain came back which it did to call to set up a shot that he got in the pass  He has pain in left arm and is a 9-10/10  Please advise

## 2022-07-28 NOTE — TELEPHONE ENCOUNTER
Left message with his wife for the patient to call to schedule his injection    Gave my direct phone number [phone]

## 2022-07-29 ENCOUNTER — TELEPHONE (OUTPATIENT)
Dept: PAIN MEDICINE | Facility: MEDICAL CENTER | Age: 67
End: 2022-07-29

## 2022-07-29 NOTE — TELEPHONE ENCOUNTER
Patient wife called back stating she would like to schedule procedure for  she states morning preferred  Pleaser advise     Patient can be reached at [phone] ty

## 2022-07-29 NOTE — TELEPHONE ENCOUNTER
Scheduled patient for 8/12/22  Patient denies RX blood thinners/ NSAIDS  Nothing to eat or drink 1 hour prior to procedure  Needs to arrange transportation  Proper clothing for procedure  No vaccines 2 weeks prior or after procedure  If ill or place on antibiotics, please call to reschedule

## 2022-08-12 ENCOUNTER — APPOINTMENT (OUTPATIENT)
Dept: RADIOLOGY | Facility: HOSPITAL | Age: 67
End: 2022-08-12
Payer: COMMERCIAL

## 2022-08-12 ENCOUNTER — HOSPITAL ENCOUNTER (OUTPATIENT)
Facility: AMBULARY SURGERY CENTER | Age: 67
Setting detail: OUTPATIENT SURGERY
Discharge: HOME | End: 2022-08-12
Attending: ANESTHESIOLOGY | Admitting: ANESTHESIOLOGY
Payer: COMMERCIAL

## 2022-08-12 VITALS
OXYGEN SATURATION: 97 % | WEIGHT: 204 LBS | SYSTOLIC BLOOD PRESSURE: 159 MMHG | HEIGHT: 73 IN | DIASTOLIC BLOOD PRESSURE: 98 MMHG | HEART RATE: 89 BPM | TEMPERATURE: 97.7 F | RESPIRATION RATE: 18 BRPM | BODY MASS INDEX: 27.04 KG/M2

## 2022-08-12 PROCEDURE — 62321 NJX INTERLAMINAR CRV/THRC: CPT | Performed by: ANESTHESIOLOGY

## 2022-08-12 RX ORDER — LIDOCAINE HYDROCHLORIDE 10 MG/ML
INJECTION, SOLUTION EPIDURAL; INFILTRATION; INTRACAUDAL; PERINEURAL AS NEEDED
Status: DISCONTINUED | OUTPATIENT
Start: 2022-08-12 | End: 2022-08-12 | Stop reason: HOSPADM

## 2022-08-12 RX ORDER — METHYLPREDNISOLONE ACETATE 80 MG/ML
INJECTION, SUSPENSION INTRA-ARTICULAR; INTRALESIONAL; INTRAMUSCULAR; SOFT TISSUE AS NEEDED
Status: DISCONTINUED | OUTPATIENT
Start: 2022-08-12 | End: 2022-08-12 | Stop reason: HOSPADM

## 2022-08-12 NOTE — H&P
History of Present Illness: The patient is a 74 y o  male who presents with complaints of neck pain  Patient Active Problem List   Diagnosis    Benign essential hypertension    Esophageal reflux    Cervical spinal stenosis    Degeneration of C5-C6 intervertebral disc    Cervicalgia    Chronic pain syndrome    Cervical radiculitis    Lumbar radiculopathy, chronic    Lymphocytosis    Urinary frequency    Prediabetes    Bilateral renal cysts    BMI 26 0-26 9,adult       Past Medical History:   Diagnosis Date    Acute pansinusitis     Bicipital tendinitis of left shoulder     GERD (gastroesophageal reflux disease)     Hyperlipidemia     Hypertension     Lateral epicondylitis     Noninfectious dermatoses of eyelid     Pneumonia     Rotator cuff tendinitis     Sciatica     Skin lesion        Past Surgical History:   Procedure Laterality Date    EPIDURAL BLOCK INJECTION N/A 6/13/2018    Procedure: C6 C7 Cervical Epidural Steroid Injection;  Surgeon: Jersey Parnell MD;  Location: [facility] MAIN OR;  Service: Pain Management     EPIDURAL BLOCK INJECTION N/A 7/13/2018    Procedure: C6 C7 Cervical Epidural Steroid Injection (42620); Surgeon: Jersey Parnell MD;  Location: [facility] MAIN OR;  Service: Pain Management     EPIDURAL BLOCK INJECTION N/A 10/12/2018    Procedure: C6 C7 Cervical Epidural Steroid Injection (64018); Surgeon: Jersey Parnell MD;  Location: [facility] MAIN OR;  Service: Pain Management     EPIDURAL BLOCK INJECTION N/A 5/31/2019    Procedure: C6 C7 Cervical Epidural Steroid Injection (65235); Surgeon: Jersey Parnell MD;  Location: [facility] OR;  Service: Pain Management        No current facility-administered medications for this encounter      No Known Allergies    Physical Exam:   Vitals:    08/12/22 0827   BP: 163/92   Pulse: 89   Resp: 16   Temp: 97 7 °F (36 5 °C)   SpO2: 98%     General: Awake, Alert, Oriented x 3, Mood and affect appropriate  Respiratory: Respirations even and unlabored  Cardiovascular: Peripheral pulses intact; no edema  Musculoskeletal Exam:  Tenderness in cervical spine region    ASA Score: 2    Patient/Chart Verification  Patient ID Verified: Verbal, Armband  ID Band Applied: Yes  Consents Confirmed: Procedural  H&P( within 30 days) Verified: Yes  Interval H&P(within 24 hr) Complete (required for Outpatients and Surgery Admit only): Yes  Beta Blocker given : N/A  Pre-op Lab/Test Results Available:  In chart  Does Patient Have a Prosthetic Device/Implant: No    Assessment:  Neck pain    Plan:  Proceed with C6-C7 cervical epidural steroid injection

## 2022-08-12 NOTE — OP NOTE
ATTENDING PHYSICIAN:  Elicia Fitzpatrick MD     PROCEDURE:  Cervical epidural steroid injection with steroid and local anesthetic under fluoroscopy at the C6-C7 level  PREPROCEDURE DIAGNOSIS:  Neck pain and upper extremity radicular symptoms  POSTPROCEDURE DIAGNOSIS:  Neck pain and upper extremity radicular symptoms  ANESTHESIA:  Local     ESTIMATED BLOOD LOSS:  Minimal     COMPLICATIONS:  None  LOCATION:  [address]  CONSENT:  Today's procedure, its potential benefits as well as its risks and potential side effects were reviewed  Discussed risks of the procedure including bleeding, infection, nerve irritation or damage, reactions to the medications, headache, failure of the pain to improve, and potential worsening of the pain were explained to the patient who verbalized understanding and who wished to proceed  Written informed consent is thereby obtained  DESCRIPTION OF THE PROCEDURE:  After written informed consent was obtained, the patient was taken to the fluoroscopy suite and placed in the prone position  Anatomical landmarks were identified by way of fluoroscopy in multiple views  The skin of the cervical region was prepped and draped in the usual sterile fashion  Strict aseptic technique was utilized  The skin and subcutaneous tissues at the needle entry site were infiltrated with 3 mL of 1% preservative-free lidocaine using a 25-gauge 1-1/2-inch needle  A 20-gauge Tuohy needle was then incrementally advanced under fluoroscopy using a loss of resistance technique  Upon entering into the epidural space, a positive loss of resistance to air was noted and a characteristic "pop" was felt  Proper placement into the epidural space was confirmed with a hanging column technique where preservative-free normal saline was noted to flow freely to gravity in to the epidural space as well as by the administration of contrast to delineate the epidural space   There were no paresthesias reported  After negative aspiration for CSF or heme, a 6 mL injectate consisting of 1 mL of Depo-Medrol 80 mg/mL mixed with 5 mL of preservative-free normal saline was slowly injected  The patient tolerated the procedure well and all needles were removed with the tips intact  Hemostasis was maintained  There were no apparent paresthesias or complications  The skin was wiped clean and a Band-Aid was placed as appropriate  The patient was monitored for an appropriate period of time following the procedure and remained hemodynamically stable and neurovascularly intact following the procedure  The patient was ultimately discharged to home with supervision in good condition and instructed to call the office in a few days for an update or sooner as warranted  I was present for and participated in all key and critical portions of this procedure      Deangelo Day MD  8/12/2022  9:06 AM

## 2022-08-12 NOTE — INTERVAL H&P NOTE
H&P reviewed  After examining the patient I find no changes in the patients condition since the H&P had been written      Vitals:    08/12/22 0827   BP: 163/92   Pulse: 89   Resp: 16   Temp: 97 7 °F (36 5 °C)   SpO2: 98%
Alert/Awake

## 2022-08-12 NOTE — DISCHARGE INSTRUCTIONS
Epidural Steroid Injection   WHAT YOU NEED TO KNOW:   An epidural steroid injection (JOEL) is a procedure to inject steroid medicine into the epidural space  The epidural space is between your spinal cord and vertebrae  Steroids reduce inflammation and fluid buildup in your spine that may be causing pain  You may be given pain medicine along with the steroids  ACTIVITY  Do not drive or operate machinery today  No strenuous activity today - bending, lifting, etc   You may resume normal activites starting tomorrow - start slowly and as tolerated  You may shower today, but no tub baths or hot tubs  You may have numbness for several hours from the local anesthetic  Please use caution and common sense, especially with weight-bearing activities  CARE OF THE INJECTION SITE  If you have soreness or pain, apply ice to the area today (20 minutes on/20 minutes off)  Starting tomorrow, you may use warm, moist heat or ice if needed  You may have an increase or change in your discomfort for 36-48 hours after your treatment  Apply ice and continue with any pain medication you have been prescribed  Notify [facility] if you have any of the following: redness, drainage, swelling, headache, stiff neck or fever above 100°F     SPECIAL INSTRUCTIONS  Our office will contact you in approximately 7 days for a progress report  MEDICATIONS  Continue to take all routine medications  Our office may have instructed you to hold some medications  As no general anesthesia was used in today's procedure, you should not experience any side effects related to anesthesia  If you are diabetic, the steroids used in today's injection may temporarily increase your blood sugar levels after the first few days after your injection  Please keep a close eye on your sugars and alert the doctor who manages your diabetes if your sugars are significantly high from your baseline or you are symptomatic       If you have a problem specifically related to your procedure, please call our office at [phone]  Problems not related to your procedure should be directed to your primary care physician

## 2022-08-19 ENCOUNTER — TELEPHONE (OUTPATIENT)
Dept: PAIN MEDICINE | Facility: CLINIC | Age: 67
End: 2022-08-19

## 2022-09-19 DIAGNOSIS — I10 BENIGN ESSENTIAL HYPERTENSION: ICD-10-CM

## 2022-09-19 RX ORDER — LISINOPRIL AND HYDROCHLOROTHIAZIDE 20; 12.5 MG/1; MG/1
1 TABLET ORAL DAILY
Qty: 30 TABLET | Refills: 6 | Status: SHIPPED | OUTPATIENT
Start: 2022-09-19

## 2022-10-26 ENCOUNTER — CLINICAL SUPPORT (OUTPATIENT)
Dept: FAMILY MEDICINE CLINIC | Facility: CLINIC | Age: 67
End: 2022-10-26
Payer: COMMERCIAL

## 2022-10-26 VITALS — TEMPERATURE: 98.1 F

## 2022-10-26 DIAGNOSIS — Z23 NEED FOR INFLUENZA VACCINATION: Primary | ICD-10-CM

## 2022-10-26 PROCEDURE — G0008 ADMIN INFLUENZA VIRUS VAC: HCPCS

## 2022-10-26 PROCEDURE — 90662 IIV NO PRSV INCREASED AG IM: CPT

## 2023-01-15 DIAGNOSIS — E78.5 DYSLIPIDEMIA: ICD-10-CM

## 2023-01-16 RX ORDER — ATORVASTATIN CALCIUM 10 MG/1
TABLET, FILM COATED ORAL
Qty: 30 TABLET | Refills: 2 | Status: SHIPPED | OUTPATIENT
Start: 2023-01-16

## 2023-06-05 ENCOUNTER — OFFICE VISIT (OUTPATIENT)
Dept: FAMILY MEDICINE CLINIC | Facility: CLINIC | Age: 68
End: 2023-06-05
Payer: COMMERCIAL

## 2023-06-05 VITALS
WEIGHT: 202.8 LBS | HEART RATE: 98 BPM | HEIGHT: 73 IN | TEMPERATURE: 98.4 F | BODY MASS INDEX: 26.88 KG/M2 | RESPIRATION RATE: 16 BRPM | DIASTOLIC BLOOD PRESSURE: 68 MMHG | SYSTOLIC BLOOD PRESSURE: 150 MMHG

## 2023-06-05 DIAGNOSIS — R35.0 BENIGN PROSTATIC HYPERPLASIA WITH URINARY FREQUENCY: ICD-10-CM

## 2023-06-05 DIAGNOSIS — Z13.0 SCREENING FOR DEFICIENCY ANEMIA: ICD-10-CM

## 2023-06-05 DIAGNOSIS — N40.1 BENIGN PROSTATIC HYPERPLASIA WITH URINARY FREQUENCY: ICD-10-CM

## 2023-06-05 DIAGNOSIS — E78.2 MIXED HYPERLIPIDEMIA: ICD-10-CM

## 2023-06-05 DIAGNOSIS — Z12.5 PROSTATE CANCER SCREENING: ICD-10-CM

## 2023-06-05 DIAGNOSIS — R73.03 PREDIABETES: ICD-10-CM

## 2023-06-05 DIAGNOSIS — I10 BENIGN ESSENTIAL HYPERTENSION: Primary | ICD-10-CM

## 2023-06-05 DIAGNOSIS — Z13.29 SCREENING FOR THYROID DISORDER: ICD-10-CM

## 2023-06-05 DIAGNOSIS — Z23 NEED FOR TETANUS BOOSTER: ICD-10-CM

## 2023-06-05 DIAGNOSIS — K21.9 GASTROESOPHAGEAL REFLUX DISEASE WITHOUT ESOPHAGITIS: ICD-10-CM

## 2023-06-05 DIAGNOSIS — Z23 NEED FOR PNEUMOCOCCAL VACCINATION: ICD-10-CM

## 2023-06-05 PROCEDURE — 99214 OFFICE O/P EST MOD 30 MIN: CPT | Performed by: FAMILY MEDICINE

## 2023-06-05 PROCEDURE — 90715 TDAP VACCINE 7 YRS/> IM: CPT | Performed by: FAMILY MEDICINE

## 2023-06-05 PROCEDURE — 90471 IMMUNIZATION ADMIN: CPT | Performed by: FAMILY MEDICINE

## 2023-06-05 PROCEDURE — 90472 IMMUNIZATION ADMIN EACH ADD: CPT | Performed by: FAMILY MEDICINE

## 2023-06-05 PROCEDURE — 90677 PCV20 VACCINE IM: CPT | Performed by: FAMILY MEDICINE

## 2023-06-05 RX ORDER — LISINOPRIL AND HYDROCHLOROTHIAZIDE 20; 12.5 MG/1; MG/1
1 TABLET ORAL DAILY
Qty: 90 TABLET | Refills: 3 | Status: SHIPPED | OUTPATIENT
Start: 2023-06-05

## 2023-06-05 RX ORDER — OMEPRAZOLE 40 MG/1
40 CAPSULE, DELAYED RELEASE ORAL DAILY
Qty: 90 CAPSULE | Refills: 3 | Status: SHIPPED | OUTPATIENT
Start: 2023-06-05

## 2023-06-05 RX ORDER — TAMSULOSIN HYDROCHLORIDE 0.4 MG/1
0.4 CAPSULE ORAL DAILY
Qty: 30 CAPSULE | Refills: 0 | Status: SHIPPED | OUTPATIENT
Start: 2023-06-05

## 2023-06-05 NOTE — PROGRESS NOTES
Assessment/Plan:    1  Benign essential hypertension  -     lisinopril-hydrochlorothiazide (PRINZIDE,ZESTORETIC) 20-12 5 MG per tablet; Take 1 tablet by mouth daily  -     Comprehensive metabolic panel; Future  -     Magnesium; Future  -     UA (URINE) with reflex to Scope    2  Mixed hyperlipidemia  -     Lipase; Future  -     Lipid Panel with Direct LDL reflex; Future  -     TSH, 3rd generation; Future    3  Gastroesophageal reflux disease without esophagitis  -     omeprazole (PriLOSEC) 40 MG capsule; Take 1 capsule (40 mg total) by mouth daily    4  Need for pneumococcal vaccination  -     Pneumococcal Conjugate Vaccine 20-valent (Pcv20)    5  Need for tetanus booster  -     Tdap Vaccine greater than or equal to 8yo    6  Prediabetes  -     Hemoglobin A1C; Future    7  Prostate cancer screening  -     PSA, Total Screen; Future    8  Screening for deficiency anemia  -     CBC; Future    9  Benign prostatic hyperplasia with urinary frequency  -     tamsulosin (FLOMAX) 0 4 mg; Take 1 capsule (0 4 mg total) by mouth in the morning  -     UA (URINE) with reflex to Scope    10  Screening for thyroid disorder  -     TSH, 3rd generation; Future    11  BMI 26 0-26 9,adult      BMI Counseling: Body mass index is 26 76 kg/m²  The BMI is above normal  Nutrition recommendations include encouraging healthy choices of fruits and vegetables, consuming healthier snacks, moderation in carbohydrate intake, increasing intake of lean protein, reducing intake of saturated and trans fat and reducing intake of cholesterol  Exercise recommendations include exercising 3-5 times per week and strength training exercises  No pharmacotherapy was ordered  Rationale for BMI follow-up plan is due to patient being overweight or obese  Depression Screening and Follow-up Plan: Patient was screened for depression during today's encounter  They screened negative with a PHQ-2 score of 0           Subjective:      Patient ID: Miguelina Lamar is a 74 "y o  male  Chief Complaint   Patient presents with   • Follow-up     Med check        HPI  Follow-up  Interval hx reviewed  Due for labs and tdap and pneum vaccs  No acute c/o  Eye/dental care utd    The following portions of the patient's history were reviewed and updated as appropriate: allergies, current medications, past family history, past medical history, past social history, past surgical history and problem list     Review of Systems   Constitutional: Negative for fatigue and fever  Respiratory: Negative  Cardiovascular: Negative  Negative for chest pain  Gastrointestinal: Negative for abdominal pain, blood in stool and constipation  Genitourinary: Negative for dysuria  Musculoskeletal: Positive for back pain  Negative for gait problem  Neurological: Negative for dizziness, weakness and headaches  Current Outpatient Medications   Medication Sig Dispense Refill   • ascorbic acid (VITAMIN C) 500 mg tablet Take 500 mg by mouth daily     • aspirin (ECOTRIN LOW STRENGTH) 81 mg EC tablet Take 81 mg by mouth daily     • cyanocobalamin (VITAMIN B-12) 1,000 mcg tablet Take by mouth daily     • lisinopril-hydrochlorothiazide (PRINZIDE,ZESTORETIC) 20-12 5 MG per tablet Take 1 tablet by mouth daily 90 tablet 3   • omeprazole (PriLOSEC) 40 MG capsule Take 1 capsule (40 mg total) by mouth daily 90 capsule 3   • tamsulosin (FLOMAX) 0 4 mg Take 1 capsule (0 4 mg total) by mouth in the morning 30 capsule 0   • atorvastatin (LIPITOR) 10 mg tablet TAKE 1 TABLET BY MOUTH EVERY DAY 30 tablet 0     No current facility-administered medications for this visit  Objective:    /68 (BP Location: Left arm, Patient Position: Sitting, Cuff Size: Large)   Pulse 98   Temp 98 4 °F (36 9 °C)   Resp 16   Ht 6' 1\" (1 854 m)   Wt 92 kg (202 lb 12 8 oz)   BMI 26 76 kg/m²        Physical Exam  Vitals and nursing note reviewed  Constitutional:       General: He is not in acute distress       Appearance: " Normal appearance  Cardiovascular:      Rate and Rhythm: Normal rate and regular rhythm  Pulmonary:      Effort: Pulmonary effort is normal  No respiratory distress  Breath sounds: Normal breath sounds  Abdominal:      General: Bowel sounds are normal       Palpations: Abdomen is soft  Tenderness: There is no abdominal tenderness  There is no right CVA tenderness or left CVA tenderness  Musculoskeletal:      Cervical back: Neck supple  Comments: No sig tenderness   Skin:     General: Skin is warm and dry  Findings: No rash  Neurological:      Mental Status: He is alert and oriented to person, place, and time                  Franco Mai MD

## 2023-06-06 ENCOUNTER — APPOINTMENT (OUTPATIENT)
Dept: LAB | Facility: CLINIC | Age: 68
End: 2023-06-06
Payer: COMMERCIAL

## 2023-06-06 DIAGNOSIS — Z12.5 PROSTATE CANCER SCREENING: ICD-10-CM

## 2023-06-06 DIAGNOSIS — E78.2 MIXED HYPERLIPIDEMIA: ICD-10-CM

## 2023-06-06 DIAGNOSIS — R73.03 PREDIABETES: ICD-10-CM

## 2023-06-06 DIAGNOSIS — Z13.0 SCREENING FOR DEFICIENCY ANEMIA: ICD-10-CM

## 2023-06-06 DIAGNOSIS — Z13.29 SCREENING FOR THYROID DISORDER: ICD-10-CM

## 2023-06-06 DIAGNOSIS — I10 BENIGN ESSENTIAL HYPERTENSION: ICD-10-CM

## 2023-06-06 LAB
ALBUMIN SERPL BCP-MCNC: 4.3 G/DL (ref 3.5–5)
ALP SERPL-CCNC: 54 U/L (ref 46–116)
ALT SERPL W P-5'-P-CCNC: 42 U/L (ref 12–78)
ANION GAP SERPL CALCULATED.3IONS-SCNC: -1 MMOL/L (ref 4–13)
AST SERPL W P-5'-P-CCNC: 29 U/L (ref 5–45)
BILIRUB SERPL-MCNC: 1.23 MG/DL (ref 0.2–1)
BILIRUB UR QL STRIP: NEGATIVE
BUN SERPL-MCNC: 20 MG/DL (ref 5–25)
CALCIUM SERPL-MCNC: 9.4 MG/DL (ref 8.3–10.1)
CHLORIDE SERPL-SCNC: 104 MMOL/L (ref 96–108)
CHOLEST SERPL-MCNC: 191 MG/DL
CLARITY UR: CLEAR
CO2 SERPL-SCNC: 30 MMOL/L (ref 21–32)
COLOR UR: YELLOW
CREAT SERPL-MCNC: 1.33 MG/DL (ref 0.6–1.3)
ERYTHROCYTE [DISTWIDTH] IN BLOOD BY AUTOMATED COUNT: 13.7 % (ref 11.6–15.1)
EST. AVERAGE GLUCOSE BLD GHB EST-MCNC: 117 MG/DL
GFR SERPL CREATININE-BSD FRML MDRD: 55 ML/MIN/1.73SQ M
GLUCOSE P FAST SERPL-MCNC: 109 MG/DL (ref 65–99)
GLUCOSE UR STRIP-MCNC: NEGATIVE MG/DL
HBA1C MFR BLD: 5.7 %
HCT VFR BLD AUTO: 50.6 % (ref 36.5–49.3)
HDLC SERPL-MCNC: 56 MG/DL
HGB BLD-MCNC: 16.5 G/DL (ref 12–17)
HGB UR QL STRIP.AUTO: NEGATIVE
KETONES UR STRIP-MCNC: NEGATIVE MG/DL
LDLC SERPL CALC-MCNC: 118 MG/DL (ref 0–100)
LEUKOCYTE ESTERASE UR QL STRIP: NEGATIVE
LIPASE SERPL-CCNC: 149 U/L (ref 73–393)
MAGNESIUM SERPL-MCNC: 2.6 MG/DL (ref 1.6–2.6)
MCH RBC QN AUTO: 28.4 PG (ref 26.8–34.3)
MCHC RBC AUTO-ENTMCNC: 32.6 G/DL (ref 31.4–37.4)
MCV RBC AUTO: 87 FL (ref 82–98)
NITRITE UR QL STRIP: NEGATIVE
PH UR STRIP.AUTO: 6 [PH]
PLATELET # BLD AUTO: 280 THOUSANDS/UL (ref 149–390)
PMV BLD AUTO: 10.8 FL (ref 8.9–12.7)
POTASSIUM SERPL-SCNC: 4.9 MMOL/L (ref 3.5–5.3)
PROT SERPL-MCNC: 7.5 G/DL (ref 6.4–8.4)
PROT UR STRIP-MCNC: NEGATIVE MG/DL
PSA SERPL-MCNC: 1.19 NG/ML (ref 0–4)
RBC # BLD AUTO: 5.81 MILLION/UL (ref 3.88–5.62)
SODIUM SERPL-SCNC: 133 MMOL/L (ref 135–147)
SP GR UR STRIP.AUTO: >=1.03 (ref 1–1.03)
TRIGL SERPL-MCNC: 83 MG/DL
TSH SERPL DL<=0.05 MIU/L-ACNC: 1.39 UIU/ML (ref 0.45–4.5)
UROBILINOGEN UR STRIP-ACNC: <2 MG/DL
WBC # BLD AUTO: 11.94 THOUSAND/UL (ref 4.31–10.16)

## 2023-06-06 PROCEDURE — 83735 ASSAY OF MAGNESIUM: CPT

## 2023-06-06 PROCEDURE — 84443 ASSAY THYROID STIM HORMONE: CPT

## 2023-06-06 PROCEDURE — 36415 COLL VENOUS BLD VENIPUNCTURE: CPT

## 2023-06-06 PROCEDURE — 85027 COMPLETE CBC AUTOMATED: CPT

## 2023-06-06 PROCEDURE — 81003 URINALYSIS AUTO W/O SCOPE: CPT | Performed by: FAMILY MEDICINE

## 2023-06-06 PROCEDURE — 83036 HEMOGLOBIN GLYCOSYLATED A1C: CPT

## 2023-06-06 PROCEDURE — G0103 PSA SCREENING: HCPCS

## 2023-06-06 PROCEDURE — 80053 COMPREHEN METABOLIC PANEL: CPT

## 2023-06-06 PROCEDURE — 83690 ASSAY OF LIPASE: CPT

## 2023-06-06 PROCEDURE — 80061 LIPID PANEL: CPT

## 2023-06-29 DIAGNOSIS — E78.5 DYSLIPIDEMIA: ICD-10-CM

## 2023-06-29 RX ORDER — ATORVASTATIN CALCIUM 10 MG/1
TABLET, FILM COATED ORAL
Qty: 30 TABLET | Refills: 0 | Status: SHIPPED | OUTPATIENT
Start: 2023-06-29

## 2023-07-27 DIAGNOSIS — E78.5 DYSLIPIDEMIA: ICD-10-CM

## 2023-07-27 RX ORDER — ATORVASTATIN CALCIUM 10 MG/1
TABLET, FILM COATED ORAL
Qty: 30 TABLET | Refills: 0 | Status: SHIPPED | OUTPATIENT
Start: 2023-07-27 | End: 2023-08-23

## 2023-08-04 PROBLEM — Z13.29 SCREENING FOR THYROID DISORDER: Status: RESOLVED | Noted: 2023-06-05 | Resolved: 2023-08-04

## 2023-08-23 DIAGNOSIS — E78.5 DYSLIPIDEMIA: ICD-10-CM

## 2023-08-23 RX ORDER — ATORVASTATIN CALCIUM 10 MG/1
TABLET, FILM COATED ORAL
Qty: 30 TABLET | Refills: 0 | Status: SHIPPED | OUTPATIENT
Start: 2023-08-23 | End: 2023-09-21

## 2023-09-19 ENCOUNTER — CLINICAL SUPPORT (OUTPATIENT)
Dept: FAMILY MEDICINE CLINIC | Facility: CLINIC | Age: 68
End: 2023-09-19
Payer: COMMERCIAL

## 2023-09-19 DIAGNOSIS — Z23 ENCOUNTER FOR ADMINISTRATION OF VACCINE: Primary | ICD-10-CM

## 2023-09-19 PROCEDURE — 90662 IIV NO PRSV INCREASED AG IM: CPT

## 2023-09-19 PROCEDURE — G0008 ADMIN INFLUENZA VIRUS VAC: HCPCS

## 2023-09-21 DIAGNOSIS — E78.5 DYSLIPIDEMIA: ICD-10-CM

## 2023-09-21 RX ORDER — ATORVASTATIN CALCIUM 10 MG/1
TABLET, FILM COATED ORAL
Qty: 30 TABLET | Refills: 0 | Status: SHIPPED | OUTPATIENT
Start: 2023-09-21

## 2023-10-19 DIAGNOSIS — E78.5 DYSLIPIDEMIA: ICD-10-CM

## 2023-10-19 RX ORDER — ATORVASTATIN CALCIUM 10 MG/1
TABLET, FILM COATED ORAL
Qty: 90 TABLET | Refills: 1 | Status: SHIPPED | OUTPATIENT
Start: 2023-10-19

## 2023-12-19 ENCOUNTER — OFFICE VISIT (OUTPATIENT)
Dept: FAMILY MEDICINE CLINIC | Facility: CLINIC | Age: 68
End: 2023-12-19
Payer: COMMERCIAL

## 2023-12-19 VITALS
DIASTOLIC BLOOD PRESSURE: 71 MMHG | SYSTOLIC BLOOD PRESSURE: 131 MMHG | OXYGEN SATURATION: 97 % | WEIGHT: 199.6 LBS | RESPIRATION RATE: 18 BRPM | HEART RATE: 95 BPM | BODY MASS INDEX: 26.33 KG/M2 | TEMPERATURE: 98.3 F

## 2023-12-19 DIAGNOSIS — J01.00 ACUTE NON-RECURRENT MAXILLARY SINUSITIS: ICD-10-CM

## 2023-12-19 DIAGNOSIS — I10 BENIGN ESSENTIAL HYPERTENSION: ICD-10-CM

## 2023-12-19 DIAGNOSIS — N40.1 BENIGN PROSTATIC HYPERPLASIA WITH URINARY FREQUENCY: ICD-10-CM

## 2023-12-19 DIAGNOSIS — R09.81 CONGESTION OF NASAL SINUS: Primary | ICD-10-CM

## 2023-12-19 DIAGNOSIS — G89.4 CHRONIC PAIN SYNDROME: ICD-10-CM

## 2023-12-19 DIAGNOSIS — R35.0 BENIGN PROSTATIC HYPERPLASIA WITH URINARY FREQUENCY: ICD-10-CM

## 2023-12-19 LAB
SARS-COV-2 AG UPPER RESP QL IA: NEGATIVE
VALID CONTROL: NORMAL

## 2023-12-19 PROCEDURE — 99214 OFFICE O/P EST MOD 30 MIN: CPT | Performed by: STUDENT IN AN ORGANIZED HEALTH CARE EDUCATION/TRAINING PROGRAM

## 2023-12-19 PROCEDURE — 87811 SARS-COV-2 COVID19 W/OPTIC: CPT | Performed by: STUDENT IN AN ORGANIZED HEALTH CARE EDUCATION/TRAINING PROGRAM

## 2023-12-19 RX ORDER — METHYLPREDNISOLONE 4 MG/1
TABLET ORAL
Qty: 21 EACH | Refills: 0 | Status: SHIPPED | OUTPATIENT
Start: 2023-12-19

## 2023-12-19 RX ORDER — DOXYCYCLINE HYCLATE 100 MG/1
100 CAPSULE ORAL EVERY 12 HOURS SCHEDULED
Qty: 14 CAPSULE | Refills: 0 | Status: SHIPPED | OUTPATIENT
Start: 2023-12-19 | End: 2023-12-26

## 2023-12-19 NOTE — PROGRESS NOTES
Clinic Visit Note  Ed Mccauley 68 y.o. male   MRN: [id number]    Assessment and Plan      Diagnoses and all orders for this visit:    Congestion of nasal sinus  -     POCT Rapid Covid Ag  -     methylPREDNISolone 4 MG tablet therapy pack; Use as directed on package    Acute non-recurrent maxillary sinusitis  Recommend antibiotic therapy given presumed maxillary bacterial sinusitis, if symptoms worsen or not improving reevaluation recommended  -     doxycycline hyclate (VIBRAMYCIN) 100 mg capsule; Take 1 capsule (100 mg total) by mouth every 12 (twelve) hours for 7 days    Benign essential hypertension  Blood pressure appropriate, continue antihypertensive medication    Chronic pain syndrome    Benign prostatic hyperplasia with urinary frequency  Flomax therapy    My impressions and treatment recommendations were discussed in detail with the patient who verbalized understanding and had no further questions.  Discharge instructions were provided.    Subjective     Chief Complaint: Acute care visit    History of Present Illness:    Patient is a pleasant 68-year-old male coming in for upper respiratory tract infection symptoms been going on for over a week now, severe sinus congestion.    The following portions of the patient's history were reviewed and updated as appropriate: allergies, current medications, past family history, past medical history, past social history, past surgical history and problem list.    REVIEW OF SYSTEMS:  A complete 12-point review of systems is negative other than that noted in the HPI.    Review of Systems   Constitutional:  Positive for fatigue. Negative for chills and fever.   HENT:  Positive for congestion and postnasal drip. Negative for sore throat.    Respiratory:  Positive for cough. Negative for shortness of breath and wheezing.    Cardiovascular:  Negative for chest pain, palpitations and leg swelling.   Neurological:  Negative for dizziness and headaches.         Current  Outpatient Medications:   •  ascorbic acid (VITAMIN C) 500 mg tablet, Take 500 mg by mouth daily, Disp: , Rfl:   •  aspirin (ECOTRIN LOW STRENGTH) 81 mg EC tablet, Take 81 mg by mouth daily, Disp: , Rfl:   •  atorvastatin (LIPITOR) 10 mg tablet, TAKE 1 TABLET BY MOUTH EVERY DAY, Disp: 90 tablet, Rfl: 1  •  cyanocobalamin (VITAMIN B-12) 1,000 mcg tablet, Take by mouth daily, Disp: , Rfl:   •  doxycycline hyclate (VIBRAMYCIN) 100 mg capsule, Take 1 capsule (100 mg total) by mouth every 12 (twelve) hours for 7 days, Disp: 14 capsule, Rfl: 0  •  lisinopril-hydrochlorothiazide (PRINZIDE,ZESTORETIC) 20-12.5 MG per tablet, Take 1 tablet by mouth daily, Disp: 90 tablet, Rfl: 3  •  methylPREDNISolone 4 MG tablet therapy pack, Use as directed on package, Disp: 21 each, Rfl: 0  •  omeprazole (PriLOSEC) 40 MG capsule, Take 1 capsule (40 mg total) by mouth daily, Disp: 90 capsule, Rfl: 3  •  tamsulosin (FLOMAX) 0.4 mg, TAKE 1 CAPSULE (0.4 MG TOTAL) BY MOUTH IN THE MORNING, Disp: 90 capsule, Rfl: 3  Past Medical History:   Diagnosis Date   • Acute pansinusitis    • Bicipital tendinitis of left shoulder    • GERD (gastroesophageal reflux disease)    • Hyperlipidemia    • Hypertension    • Lateral epicondylitis    • Noninfectious dermatoses of eyelid    • Pneumonia    • Rotator cuff tendinitis    • Sciatica    • Skin lesion      Past Surgical History:   Procedure Laterality Date   • EPIDURAL BLOCK INJECTION N/A 6/13/2018    Procedure: C6 C7 Cervical Epidural Steroid Injection;  Surgeon: Lucia Watts MD;  Location: [facility] MAIN OR;  Service: Pain Management    • EPIDURAL BLOCK INJECTION N/A 7/13/2018    Procedure: C6 C7 Cervical Epidural Steroid Injection (09730);  Surgeon: Lucia Watts MD;  Location: [facility] MAIN OR;  Service: Pain Management    • EPIDURAL BLOCK INJECTION N/A 10/12/2018    Procedure: C6 C7 Cervical Epidural Steroid Injection (74128);  Surgeon: Lucia Watts MD;  Location: [facility] MAIN OR;  Service: Pain  Management    • EPIDURAL BLOCK INJECTION N/A 2019    Procedure: C6 C7 Cervical Epidural Steroid Injection (21364);  Surgeon: Lucia Watts MD;  Location: [facility] MAIN OR;  Service: Pain Management    • EPIDURAL BLOCK INJECTION N/A 2022    Procedure: C6 C7 CERVICAL epidural steroid injection (51883);  Surgeon: Lucia Watts MD;  Location: [facility] MAIN OR;  Service: Pain Management      Social History     Socioeconomic History   • Marital status: /Civil Union     Spouse name: Not on file   • Number of children: Not on file   • Years of education: Not on file   • Highest education level: Not on file   Occupational History   • Not on file   Tobacco Use   • Smoking status: Former     Current packs/day: 0.00     Types: Cigarettes     Start date: 3/30/1972     Quit date: 1987     Years since quittin.9   • Smokeless tobacco: Never   Vaping Use   • Vaping status: Never Used   Substance and Sexual Activity   • Alcohol use: No   • Drug use: Never   • Sexual activity: Yes     Partners: Female   Other Topics Concern   • Not on file   Social History Narrative   • Not on file     Social Determinants of Health     Financial Resource Strain: Not on file   Food Insecurity: Not on file   Transportation Needs: Not on file   Physical Activity: Not on file   Stress: Not on file   Social Connections: Not on file   Intimate Partner Violence: Not on file   Housing Stability: Not on file     Family History   Problem Relation Age of Onset   • No Known Problems Mother    • No Known Problems Father      No Known Allergies    Objective     Vitals:    23 1052   BP: 131/71   BP Location: Left arm   Patient Position: Sitting   Cuff Size: Large   Pulse: 95   Resp: 18   Temp: 98.3 °F (36.8 °C)   SpO2: 97%   Weight: 90.5 kg (199 lb 9.6 oz)       Physical Exam:     GENERAL: NAD, pleasant   HEENT:  NC/AT, PERRL, EOMI, no scleral icterus, pharynx erythema  CARDIAC:  RRR, +S1/S2, no S3/S4 appreciated, no m/g/r  PULMONARY:   CTA B/L, no wheezing/rales/rhonci, non-labored breathing  ABDOMEN:  Soft, NT/ND, no rebound/guarding/rigidity  Extremities:. No edema, cyanosis, or clubbing  Musculoskeletal:  Full range of motion intact in all extremities   NEUROLOGIC: Grossly intact, no focal deficits  SKIN:  No rashes or erythema noted on exposed skin  Psych: Normal affect, mood stable    ==  PLEASE NOTE:  This encounter was completed utilizing the [employer] (acquired by [employer] in 2014)/[employer] Direct Speech Voice Recognition Software. Grammatical errors, random word insertions, pronoun errors and incomplete sentences are occasional consequences of the system due to software limitations, ambient noise and hardware issues.These may be missed by proof reading prior to affixing electronic signature. Any questions or concerns about the content, text or information contained within the body of this dictation should be directly addressed to the physician for clarification. Please do not hesitate to call me directly if you have any any questions or concerns.    Jc Maria, DO  [facility] Internal Medicine   [facility]

## 2024-01-04 ENCOUNTER — TELEPHONE (OUTPATIENT)
Age: 69
End: 2024-01-04

## 2024-01-04 NOTE — TELEPHONE ENCOUNTER
Patient wife Marilyn called in to inform that patient is still coughing he was seen on 12/19 at the practice by Dr. Maria. Requesting if he could be prescribed with cough medicine and script to be sent in to the pharmacy. As she requested for early morning appt with the practice was unable to schedule any. If there is any accomodation she would want to get the patient to be seen at the practice. Please return call to her. Thanks.

## 2024-01-09 ENCOUNTER — TELEMEDICINE (OUTPATIENT)
Dept: FAMILY MEDICINE CLINIC | Facility: CLINIC | Age: 69
End: 2024-01-09
Payer: COMMERCIAL

## 2024-01-09 DIAGNOSIS — E87.8 ELECTROLYTE ABNORMALITY: ICD-10-CM

## 2024-01-09 DIAGNOSIS — R19.7 DIARRHEA, UNSPECIFIED TYPE: ICD-10-CM

## 2024-01-09 DIAGNOSIS — I10 BENIGN ESSENTIAL HYPERTENSION: ICD-10-CM

## 2024-01-09 DIAGNOSIS — R05.2 SUBACUTE COUGH: ICD-10-CM

## 2024-01-09 DIAGNOSIS — J22 LOWER RESPIRATORY TRACT INFECTION: Primary | ICD-10-CM

## 2024-01-09 PROCEDURE — 99442 PR PHYS/QHP TELEPHONE EVALUATION 11-20 MIN: CPT | Performed by: STUDENT IN AN ORGANIZED HEALTH CARE EDUCATION/TRAINING PROGRAM

## 2024-01-09 NOTE — PROGRESS NOTES
Virtual Brief Visit    This Visit is being completed by telephone. The Patient is located at Home and in the following state in which I hold an active license [place]    The patient was identified by name and date of birth. Ed Mccauley was informed that this is a telemedicine visit and that the visit is being conducted through Telephone.  My office door was closed. No one else was in the room.  He acknowledged consent and understanding of privacy and security of the video platform. The patient has agreed to participate and understands they can discontinue the visit at any time.    Patient is aware this is a billable service.       Assessment/Plan:    Problem List Items Addressed This Visit        Cardiovascular and Mediastinum    Benign essential hypertension   Other Visit Diagnoses     Lower respiratory tract infection    -  Primary    Relevant Orders    CBC and differential    XR chest pa & lateral    Procalcitonin    Subacute cough        Relevant Orders    XR chest pa & lateral    Procalcitonin    Diarrhea, unspecified type        Relevant Orders    Ova and parasite examination    Giardia antigen    Fecal fat, quantitative    Clostridium difficile toxin by PCR    Stool Enteric Bacterial Panel by PCR    Electrolyte abnormality        Relevant Orders    Comprehensive metabolic panel        Patient is a pleasant 68-year-old male presenting as a virtual visit secondary to ongoing subacute cough, upper/lower respiratory tract infection symptoms and diarrhea greater than 2 weeks.  Patient has been keeping symptoms under control with Pepto-Bismol, notes dark stools but no blood.    Patient previously treated mid December with doxycycline/steroid for presumed maxillary sinusitis.  Patient also notes multiple sick contacts at work.    Unclear etiology, no conversational dyspnea, suspect underlying infection, obtain blood work, chest x-ray imaging.  Will hold off on medical therapy until stool studies are obtained.  Continue  conservative management, hydration/nutrition, rest, OTC symptomatic management at this time.  Close follow-up encouraged.  No red flag symptoms.    Recent Visits  No visits were found meeting these conditions.  Showing recent visits within past 7 days and meeting all other requirements  Today's Visits  Date Type Provider Dept   01/09/24 Telemedicine DO Miguel Lemos   Showing today's visits and meeting all other requirements  Future Appointments  No visits were found meeting these conditions.  Showing future appointments within next 150 days and meeting all other requirements         Visit Time  Total Visit Duration: 14 minutes

## 2024-01-09 NOTE — TELEPHONE ENCOUNTER
Patient is calling stating he is still sick from a month ago.  He is coughing, congested, diarrhea.  He would like to get in to see the doctor.  I do not see anything this week.  Can we call patient back.  Thank you

## 2024-04-09 DIAGNOSIS — E78.5 DYSLIPIDEMIA: ICD-10-CM

## 2024-04-09 RX ORDER — ATORVASTATIN CALCIUM 10 MG/1
TABLET, FILM COATED ORAL
Qty: 90 TABLET | Refills: 1 | Status: SHIPPED | OUTPATIENT
Start: 2024-04-09

## 2024-05-08 DIAGNOSIS — K21.9 GASTROESOPHAGEAL REFLUX DISEASE WITHOUT ESOPHAGITIS: ICD-10-CM

## 2024-05-08 DIAGNOSIS — I10 BENIGN ESSENTIAL HYPERTENSION: ICD-10-CM

## 2024-05-08 RX ORDER — LISINOPRIL AND HYDROCHLOROTHIAZIDE 20; 12.5 MG/1; MG/1
1 TABLET ORAL DAILY
Qty: 30 TABLET | Refills: 0 | Status: SHIPPED | OUTPATIENT
Start: 2024-05-08

## 2024-05-08 RX ORDER — OMEPRAZOLE 40 MG/1
40 CAPSULE, DELAYED RELEASE ORAL DAILY
Qty: 30 CAPSULE | Refills: 5 | Status: SHIPPED | OUTPATIENT
Start: 2024-05-08

## 2024-05-08 NOTE — TELEPHONE ENCOUNTER
Patient needs updated blood work and has previously placed orders. Please contact patient to go for labs. [facility] refill provided (lisinopril).

## 2024-06-01 DIAGNOSIS — I10 BENIGN ESSENTIAL HYPERTENSION: ICD-10-CM

## 2024-06-02 RX ORDER — LISINOPRIL AND HYDROCHLOROTHIAZIDE 20; 12.5 MG/1; MG/1
1 TABLET ORAL DAILY
Qty: 30 TABLET | Refills: 0 | Status: SHIPPED | OUTPATIENT
Start: 2024-06-02

## 2024-06-02 NOTE — TELEPHONE ENCOUNTER
Patient needs updated blood work and has previously placed orders 01.2024. Please contact patient to go for labs.     [facility] refill provided.

## 2024-06-27 DIAGNOSIS — I10 BENIGN ESSENTIAL HYPERTENSION: ICD-10-CM

## 2024-06-27 RX ORDER — LISINOPRIL AND HYDROCHLOROTHIAZIDE 20; 12.5 MG/1; MG/1
1 TABLET ORAL DAILY
Qty: 90 TABLET | Refills: 0 | Status: SHIPPED | OUTPATIENT
Start: 2024-06-27

## 2024-06-27 NOTE — TELEPHONE ENCOUNTER
Orders I see in chart are from Dr. Maria from January---would schedule follow-up appt before anything done as these probably no longer applicable---

## 2024-07-15 ENCOUNTER — OFFICE VISIT (OUTPATIENT)
Dept: FAMILY MEDICINE CLINIC | Facility: CLINIC | Age: 69
End: 2024-07-15
Payer: COMMERCIAL

## 2024-07-15 VITALS
BODY MASS INDEX: 27.04 KG/M2 | SYSTOLIC BLOOD PRESSURE: 130 MMHG | TEMPERATURE: 98.5 F | RESPIRATION RATE: 12 BRPM | HEIGHT: 73 IN | WEIGHT: 204 LBS | DIASTOLIC BLOOD PRESSURE: 68 MMHG

## 2024-07-15 DIAGNOSIS — I10 BENIGN ESSENTIAL HYPERTENSION: Primary | ICD-10-CM

## 2024-07-15 DIAGNOSIS — Z13.29 SCREENING FOR THYROID DISORDER: ICD-10-CM

## 2024-07-15 DIAGNOSIS — K21.9 GASTROESOPHAGEAL REFLUX DISEASE, UNSPECIFIED WHETHER ESOPHAGITIS PRESENT: ICD-10-CM

## 2024-07-15 DIAGNOSIS — Z11.59 NEED FOR HEPATITIS C SCREENING TEST: ICD-10-CM

## 2024-07-15 DIAGNOSIS — E78.2 MIXED HYPERLIPIDEMIA: ICD-10-CM

## 2024-07-15 DIAGNOSIS — Z13.0 SCREENING FOR DEFICIENCY ANEMIA: ICD-10-CM

## 2024-07-15 DIAGNOSIS — Z12.5 SCREENING FOR PROSTATE CANCER: ICD-10-CM

## 2024-07-15 DIAGNOSIS — R73.03 PREDIABETES: ICD-10-CM

## 2024-07-15 PROCEDURE — 99214 OFFICE O/P EST MOD 30 MIN: CPT | Performed by: FAMILY MEDICINE

## 2024-07-15 PROCEDURE — G2211 COMPLEX E/M VISIT ADD ON: HCPCS | Performed by: FAMILY MEDICINE

## 2024-07-15 NOTE — PROGRESS NOTES
Assessment/Plan:  Diagnoses and all orders for this visit:    Benign essential hypertension  -     Comprehensive metabolic panel; Future  -     UA (URINE) with reflex to Scope; Future  -     Ambulatory Referral to Ophthalmology; Future    Gastroesophageal reflux disease, unspecified whether esophagitis present  -     Magnesium; Future    Mixed hyperlipidemia  -     Lipase; Future  -     Lipid Panel with Direct LDL reflex; Future  -     TSH, 3rd generation; Future    Prediabetes  -     Comprehensive metabolic panel; Future  -     Hemoglobin A1C; Future  -     Ambulatory Referral to Ophthalmology; Future    Screening for deficiency anemia  -     CBC; Future    Need for hepatitis C screening test  -     Hepatitis C antibody; Future    Screening for thyroid disorder  -     TSH, 3rd generation; Future    Screening for prostate cancer  -     PSA, Total Screen; Future    BMI 26.0-26.9,adult        Subjective:      Patient ID: Ed Mccauley is a 69 y.o. male.    Chief Complaint   Patient presents with   • Follow-up     6 mo       HPI    The following portions of the patient's history were reviewed and updated as appropriate: allergies, current medications, past family history, past medical history, past social history, past surgical history and problem list.    Review of Systems      Current Outpatient Medications   Medication Sig Dispense Refill   • ascorbic acid (VITAMIN C) 500 mg tablet Take 500 mg by mouth daily     • aspirin (ECOTRIN LOW STRENGTH) 81 mg EC tablet Take 81 mg by mouth daily     • atorvastatin (LIPITOR) 10 mg tablet TAKE 1 TABLET BY MOUTH EVERY DAY 90 tablet 1   • cyanocobalamin (VITAMIN B-12) 1,000 mcg tablet Take by mouth daily     • lisinopril-hydrochlorothiazide (PRINZIDE,ZESTORETIC) 20-12.5 MG per tablet TAKE 1 TABLET BY MOUTH EVERY DAY 90 tablet 0   • omeprazole (PriLOSEC) 40 MG capsule TAKE 1 CAPSULE (40 MG TOTAL) BY MOUTH DAILY. 30 capsule 5     No current facility-administered medications for  "this visit.       Objective:    /68 (BP Location: Left arm, Patient Position: Sitting, Cuff Size: Large)   Temp 98.5 °F (36.9 °C) (Temporal)   Resp 12   Ht 6' 1\" (1.854 m)   Wt 92.5 kg (204 lb)   BMI 26.91 kg/m²        Physical Exam           Rebeca Whittington MD  "

## 2024-07-16 ENCOUNTER — APPOINTMENT (OUTPATIENT)
Dept: LAB | Facility: CLINIC | Age: 69
End: 2024-07-16
Payer: COMMERCIAL

## 2024-07-16 DIAGNOSIS — Z12.5 SCREENING FOR PROSTATE CANCER: ICD-10-CM

## 2024-07-16 DIAGNOSIS — Z13.29 SCREENING FOR THYROID DISORDER: ICD-10-CM

## 2024-07-16 DIAGNOSIS — K21.9 GASTROESOPHAGEAL REFLUX DISEASE, UNSPECIFIED WHETHER ESOPHAGITIS PRESENT: ICD-10-CM

## 2024-07-16 DIAGNOSIS — Z11.59 NEED FOR HEPATITIS C SCREENING TEST: ICD-10-CM

## 2024-07-16 DIAGNOSIS — R73.03 PREDIABETES: ICD-10-CM

## 2024-07-16 DIAGNOSIS — E78.2 MIXED HYPERLIPIDEMIA: ICD-10-CM

## 2024-07-16 DIAGNOSIS — I10 BENIGN ESSENTIAL HYPERTENSION: ICD-10-CM

## 2024-07-16 DIAGNOSIS — Z13.0 SCREENING FOR DEFICIENCY ANEMIA: ICD-10-CM

## 2024-07-16 LAB
ALBUMIN SERPL BCG-MCNC: 4.1 G/DL (ref 3.5–5)
ALP SERPL-CCNC: 36 U/L (ref 34–104)
ALT SERPL W P-5'-P-CCNC: 32 U/L (ref 7–52)
ANION GAP SERPL CALCULATED.3IONS-SCNC: 7 MMOL/L (ref 4–13)
AST SERPL W P-5'-P-CCNC: 29 U/L (ref 13–39)
BILIRUB SERPL-MCNC: 1.01 MG/DL (ref 0.2–1)
BILIRUB UR QL STRIP: NEGATIVE
BUN SERPL-MCNC: 17 MG/DL (ref 5–25)
CALCIUM SERPL-MCNC: 9.8 MG/DL (ref 8.4–10.2)
CHLORIDE SERPL-SCNC: 100 MMOL/L (ref 96–108)
CHOLEST SERPL-MCNC: 181 MG/DL
CLARITY UR: CLEAR
CO2 SERPL-SCNC: 31 MMOL/L (ref 21–32)
COLOR UR: COLORLESS
CREAT SERPL-MCNC: 1.16 MG/DL (ref 0.6–1.3)
ERYTHROCYTE [DISTWIDTH] IN BLOOD BY AUTOMATED COUNT: 14 % (ref 11.6–15.1)
EST. AVERAGE GLUCOSE BLD GHB EST-MCNC: 128 MG/DL
GFR SERPL CREATININE-BSD FRML MDRD: 64 ML/MIN/1.73SQ M
GLUCOSE P FAST SERPL-MCNC: 109 MG/DL (ref 65–99)
GLUCOSE UR STRIP-MCNC: NEGATIVE MG/DL
HBA1C MFR BLD: 6.1 %
HCT VFR BLD AUTO: 46.2 % (ref 36.5–49.3)
HCV AB SER QL: NORMAL
HDLC SERPL-MCNC: 56 MG/DL
HGB BLD-MCNC: 15.1 G/DL (ref 12–17)
HGB UR QL STRIP.AUTO: NEGATIVE
KETONES UR STRIP-MCNC: NEGATIVE MG/DL
LDLC SERPL CALC-MCNC: 108 MG/DL (ref 0–100)
LEUKOCYTE ESTERASE UR QL STRIP: NEGATIVE
LIPASE SERPL-CCNC: 36 U/L (ref 11–82)
MAGNESIUM SERPL-MCNC: 2 MG/DL (ref 1.9–2.7)
MCH RBC QN AUTO: 28.3 PG (ref 26.8–34.3)
MCHC RBC AUTO-ENTMCNC: 32.7 G/DL (ref 31.4–37.4)
MCV RBC AUTO: 87 FL (ref 82–98)
NITRITE UR QL STRIP: NEGATIVE
PH UR STRIP.AUTO: 7 [PH]
PLATELET # BLD AUTO: 258 THOUSANDS/UL (ref 149–390)
PMV BLD AUTO: 11.4 FL (ref 8.9–12.7)
POTASSIUM SERPL-SCNC: 4.8 MMOL/L (ref 3.5–5.3)
PROT SERPL-MCNC: 6.4 G/DL (ref 6.4–8.4)
PROT UR STRIP-MCNC: NEGATIVE MG/DL
PSA SERPL-MCNC: 1.4 NG/ML (ref 0–4)
RBC # BLD AUTO: 5.34 MILLION/UL (ref 3.88–5.62)
SODIUM SERPL-SCNC: 138 MMOL/L (ref 135–147)
SP GR UR STRIP.AUTO: 1.01 (ref 1–1.03)
TRIGL SERPL-MCNC: 85 MG/DL
TSH SERPL DL<=0.05 MIU/L-ACNC: 1.12 UIU/ML (ref 0.45–4.5)
UROBILINOGEN UR STRIP-ACNC: <2 MG/DL
WBC # BLD AUTO: 7.1 THOUSAND/UL (ref 4.31–10.16)

## 2024-07-16 PROCEDURE — 36415 COLL VENOUS BLD VENIPUNCTURE: CPT

## 2024-07-16 PROCEDURE — 84443 ASSAY THYROID STIM HORMONE: CPT

## 2024-07-16 PROCEDURE — G0103 PSA SCREENING: HCPCS

## 2024-07-16 PROCEDURE — 80061 LIPID PANEL: CPT

## 2024-07-16 PROCEDURE — 83036 HEMOGLOBIN GLYCOSYLATED A1C: CPT

## 2024-07-16 PROCEDURE — 85027 COMPLETE CBC AUTOMATED: CPT

## 2024-07-16 PROCEDURE — 83690 ASSAY OF LIPASE: CPT

## 2024-07-16 PROCEDURE — 80053 COMPREHEN METABOLIC PANEL: CPT

## 2024-07-16 PROCEDURE — 86803 HEPATITIS C AB TEST: CPT

## 2024-07-16 PROCEDURE — 81003 URINALYSIS AUTO W/O SCOPE: CPT

## 2024-07-16 PROCEDURE — 83735 ASSAY OF MAGNESIUM: CPT

## 2024-08-14 PROBLEM — Z12.5 SCREENING FOR PROSTATE CANCER: Status: RESOLVED | Noted: 2023-06-05 | Resolved: 2024-08-14

## 2024-08-14 PROBLEM — Z11.59 NEED FOR HEPATITIS C SCREENING TEST: Status: RESOLVED | Noted: 2024-07-15 | Resolved: 2024-08-14

## 2024-09-27 DIAGNOSIS — E78.5 DYSLIPIDEMIA: ICD-10-CM

## 2024-09-27 DIAGNOSIS — I10 BENIGN ESSENTIAL HYPERTENSION: ICD-10-CM

## 2024-09-27 RX ORDER — ATORVASTATIN CALCIUM 10 MG/1
10 TABLET, FILM COATED ORAL DAILY
Qty: 30 TABLET | Refills: 5 | Status: SHIPPED | OUTPATIENT
Start: 2024-09-27

## 2024-09-27 RX ORDER — LISINOPRIL AND HYDROCHLOROTHIAZIDE 12.5; 2 MG/1; MG/1
1 TABLET ORAL DAILY
Qty: 30 TABLET | Refills: 2 | Status: SHIPPED | OUTPATIENT
Start: 2024-09-27

## 2024-10-26 DIAGNOSIS — K21.9 GASTROESOPHAGEAL REFLUX DISEASE WITHOUT ESOPHAGITIS: ICD-10-CM

## 2024-10-26 RX ORDER — OMEPRAZOLE 40 MG/1
40 CAPSULE, DELAYED RELEASE ORAL DAILY
Qty: 30 CAPSULE | Refills: 5 | Status: SHIPPED | OUTPATIENT
Start: 2024-10-26

## 2024-12-20 DIAGNOSIS — I10 BENIGN ESSENTIAL HYPERTENSION: ICD-10-CM

## 2024-12-20 RX ORDER — LISINOPRIL AND HYDROCHLOROTHIAZIDE 12.5; 2 MG/1; MG/1
1 TABLET ORAL DAILY
Qty: 30 TABLET | Refills: 5 | Status: SHIPPED | OUTPATIENT
Start: 2024-12-20

## 2025-01-07 ENCOUNTER — TELEPHONE (OUTPATIENT)
Dept: FAMILY MEDICINE CLINIC | Facility: CLINIC | Age: 70
End: 2025-01-07

## 2025-02-18 DIAGNOSIS — E78.5 DYSLIPIDEMIA: ICD-10-CM

## 2025-02-19 RX ORDER — ATORVASTATIN CALCIUM 10 MG/1
10 TABLET, FILM COATED ORAL DAILY
Qty: 90 TABLET | Refills: 1 | Status: SHIPPED | OUTPATIENT
Start: 2025-02-19

## 2025-03-26 ENCOUNTER — TELEPHONE (OUTPATIENT)
Dept: FAMILY MEDICINE CLINIC | Facility: CLINIC | Age: 70
End: 2025-03-26

## 2025-04-23 DIAGNOSIS — K21.9 GASTROESOPHAGEAL REFLUX DISEASE WITHOUT ESOPHAGITIS: ICD-10-CM

## 2025-04-23 RX ORDER — OMEPRAZOLE 40 MG/1
40 CAPSULE, DELAYED RELEASE ORAL DAILY
Qty: 90 CAPSULE | Refills: 1 | Status: SHIPPED | OUTPATIENT
Start: 2025-04-23

## 2025-04-24 DIAGNOSIS — I10 BENIGN ESSENTIAL HYPERTENSION: ICD-10-CM

## 2025-04-24 RX ORDER — LISINOPRIL AND HYDROCHLOROTHIAZIDE 12.5; 2 MG/1; MG/1
1 TABLET ORAL DAILY
Qty: 90 TABLET | Refills: 1 | Status: SHIPPED | OUTPATIENT
Start: 2025-04-24

## 2025-04-28 ENCOUNTER — OFFICE VISIT (OUTPATIENT)
Dept: FAMILY MEDICINE CLINIC | Facility: CLINIC | Age: 70
End: 2025-04-28
Payer: COMMERCIAL

## 2025-04-28 VITALS
HEART RATE: 105 BPM | SYSTOLIC BLOOD PRESSURE: 130 MMHG | DIASTOLIC BLOOD PRESSURE: 80 MMHG | OXYGEN SATURATION: 94 % | BODY MASS INDEX: 32.21 KG/M2 | RESPIRATION RATE: 18 BRPM | TEMPERATURE: 98 F | WEIGHT: 225 LBS | HEIGHT: 70 IN

## 2025-04-28 DIAGNOSIS — M25.512 ACUTE PAIN OF LEFT SHOULDER: Primary | ICD-10-CM

## 2025-04-28 DIAGNOSIS — I10 BENIGN ESSENTIAL HYPERTENSION: ICD-10-CM

## 2025-04-28 DIAGNOSIS — M54.12 CERVICAL RADICULITIS: ICD-10-CM

## 2025-04-28 DIAGNOSIS — M54.16 LUMBAR RADICULOPATHY, CHRONIC: ICD-10-CM

## 2025-04-28 PROCEDURE — G2211 COMPLEX E/M VISIT ADD ON: HCPCS | Performed by: STUDENT IN AN ORGANIZED HEALTH CARE EDUCATION/TRAINING PROGRAM

## 2025-04-28 PROCEDURE — 99214 OFFICE O/P EST MOD 30 MIN: CPT | Performed by: STUDENT IN AN ORGANIZED HEALTH CARE EDUCATION/TRAINING PROGRAM

## 2025-04-28 RX ORDER — METHYLPREDNISOLONE 4 MG/1
TABLET ORAL
Qty: 21 EACH | Refills: 0 | Status: SHIPPED | OUTPATIENT
Start: 2025-04-28

## 2025-04-28 NOTE — PROGRESS NOTES
Name: Ed Mccauley      : 1955      MRN: [id number]  Encounter Provider: Jc Maria DO  Encounter Date: 2025   Encounter department: [facility]  :  Assessment & Plan  Acute pain of left shoulder  Acute shoulder pain, concern for rotator cuff injury, positive empty can test, from gym injury, continue light stretching/strengthening, heat to area, OTC symptomatic management, steroid taper to help relieve inflammatory state.  Patient will follow-up with orthopedics on Friday, appreciate recommendations  Orders:  •  methylPREDNISolone 4 MG tablet therapy pack; Use as directed on package  •  Ambulatory Referral to Orthopedic Surgery; Future    Benign essential hypertension  Continue antihypertensive medication, blood pressure stable       Lumbar radiculopathy, chronic  History noted, continue conservative measures       Cervical radiculitis  History noted, continue conservative measures             Depression Screening and Follow-up Plan: Patient was screened for depression during today's encounter. They screened negative with a PHQ-2 score of 0.        History of Present Illness   Follow-up acute left shoulder pain after gym injury      Review of Systems   Constitutional:  Negative for chills and fever.   HENT:  Negative for ear pain and sore throat.    Eyes:  Negative for pain and visual disturbance.   Respiratory:  Negative for cough and shortness of breath.    Cardiovascular:  Negative for chest pain and palpitations.   Gastrointestinal:  Negative for abdominal pain, constipation, diarrhea, nausea and vomiting.   Genitourinary:  Negative for dysuria and hematuria.   Musculoskeletal:  Positive for arthralgias. Negative for back pain.   Skin:  Negative for color change and rash.   Neurological:  Negative for seizures and syncope.   Psychiatric/Behavioral:  Negative for agitation, behavioral problems and confusion.    All other systems reviewed and are  "negative.      Objective   /80 (BP Location: Left arm, Patient Position: Sitting, Cuff Size: Large)   Pulse 105   Temp 98 °F (36.7 °C) (Temporal)   Resp 18   Ht 5' 10\" (1.778 m)   Wt 102 kg (225 lb)   SpO2 94%   BMI 32.28 kg/m²      Physical Exam  Vitals and nursing note reviewed.   Constitutional:       General: He is not in acute distress.     Appearance: He is well-developed.   HENT:      Head: Normocephalic and atraumatic.   Eyes:      General: No scleral icterus.     Conjunctiva/sclera: Conjunctivae normal.   Cardiovascular:      Rate and Rhythm: Normal rate and regular rhythm.      Heart sounds: No murmur heard.  Pulmonary:      Effort: Pulmonary effort is normal. No respiratory distress.      Breath sounds: Normal breath sounds.   Abdominal:      General: Bowel sounds are normal. There is no distension.      Palpations: Abdomen is soft.      Tenderness: There is no abdominal tenderness.   Musculoskeletal:         General: Signs of injury present. No swelling. Normal range of motion.      Cervical back: Neck supple.   Skin:     General: Skin is warm and dry.      Capillary Refill: Capillary refill takes less than 2 seconds.      Coloration: Skin is not jaundiced.   Neurological:      General: No focal deficit present.      Mental Status: He is alert and oriented to person, place, and time. Mental status is at baseline.   Psychiatric:         Mood and Affect: Mood normal.         Behavior: Behavior normal.         "

## 2025-04-29 ENCOUNTER — TELEPHONE (OUTPATIENT)
Dept: ADMINISTRATIVE | Facility: OTHER | Age: 70
End: 2025-04-29

## 2025-04-29 NOTE — LETTER
Vaccination Request Form: COVID-19 aka SARS-CoV-2 (Moderna or Pfizer or J & J), Influenza, and Zoster      Date Requested: 25  Patient: Ed Mccauley  Patient : 1955   Referring Provider: Rebeca Whittington MD       The above patient has informed us that they have had their   most recent COVID-19 aka SARS-CoV-2 (Moderna or Pfizer or J & J), Influenza, and Zoster administered at your facility. Please   complete this form and attach all corresponding documentation.    Date of Vaccine(s) Given  ______________________________    Lot Number(s) _______________________________________    Manufacture(s) ______________________________________    Dose Amount (s) _____________________________________    Expiration Date(s) ____________________________________    Comments __________________________________________________________  ____________________________________________________________________  ____________________________________________________________________  ____________________________________________________________________    Administering Facility  ________________________________________________    Vaccine Administered By (print name) ___________________________________      Form Completed By (print name) _______________________________________      Signature ___________________________________________________________      These reports are needed for  compliance.    Please fax this completed form and a copy of the Vaccine Document(s) to the [facility] Department as soon as possible via Fax [phone], attention Piyush: Phone [phone]. Our office is located at [address] [place] [address].    We thank you for your assistance in treating our mutual patient.

## 2025-04-29 NOTE — TELEPHONE ENCOUNTER
----- Message from Ryleigh N sent at 4/28/2025  4:23 PM EDT -----  Regarding: CARE GAP REQUEST  04/28/25 4:23 PM    Hello, our patient attached above has had Immunization(COVID, FLU, Zoster) completed/performed. Please assist in updating the patient chart by making an External outreach to [facility] facility located in [facility]. The date of service is [id number].    Thank you,  Ryleigh Nemec [facility]

## 2025-04-29 NOTE — LETTER
Vaccination Request Form: COVID-19 aka SARS-CoV-2 (Moderna or Pfizer or J & J), Influenza, and Zoster      Date Requested: 25  Patient: Ed Mccauley  Patient : 1955   Referring Provider: Rebeca Whittington MD       The above patient has informed us that they have had their   most recent COVID-19 aka SARS-CoV-2 (Moderna or Pfizer or J & J), Influenza, and Zoster administered at your facility. Please   complete this form and attach all corresponding documentation.    Date of Vaccine(s) Given  ______________________________    Lot Number(s) _______________________________________    Manufacture(s) ______________________________________    Dose Amount (s) _____________________________________    Expiration Date(s) ____________________________________    Comments __________________________________________________________  ____________________________________________________________________  ____________________________________________________________________  ____________________________________________________________________    Administering Facility  ________________________________________________    Vaccine Administered By (print name) ___________________________________      Form Completed By (print name) _______________________________________      Signature ___________________________________________________________      These reports are needed for  compliance.    Please fax this completed form and a copy of the Vaccine Document(s) to the [facility] Department as soon as possible via Fax [phone], attention Piyush: Phone [phone]. Our office is located at [address] [place] [address].    We thank you for your assistance in treating our mutual patient.

## 2025-04-29 NOTE — TELEPHONE ENCOUNTER
Upon review of the In Basket request and the patient's chart, initial outreach has been made via fax to facility. Please see Contacts section for details.     Thank you  Piyush Luna MA

## 2025-05-05 ENCOUNTER — TELEPHONE (OUTPATIENT)
Dept: FAMILY MEDICINE CLINIC | Facility: CLINIC | Age: 70
End: 2025-05-05

## 2025-05-09 ENCOUNTER — APPOINTMENT (OUTPATIENT)
Dept: RADIOLOGY | Facility: CLINIC | Age: 70
End: 2025-05-09
Attending: ORTHOPAEDIC SURGERY
Payer: COMMERCIAL

## 2025-05-09 ENCOUNTER — CONSULT (OUTPATIENT)
Age: 70
End: 2025-05-09
Attending: STUDENT IN AN ORGANIZED HEALTH CARE EDUCATION/TRAINING PROGRAM
Payer: COMMERCIAL

## 2025-05-09 VITALS — WEIGHT: 225 LBS | BODY MASS INDEX: 32.21 KG/M2 | HEIGHT: 70 IN

## 2025-05-09 DIAGNOSIS — M19.012 PRIMARY OSTEOARTHRITIS OF LEFT SHOULDER: Primary | ICD-10-CM

## 2025-05-09 DIAGNOSIS — M25.512 ACUTE PAIN OF LEFT SHOULDER: ICD-10-CM

## 2025-05-09 PROCEDURE — 73030 X-RAY EXAM OF SHOULDER: CPT

## 2025-05-09 PROCEDURE — 99203 OFFICE O/P NEW LOW 30 MIN: CPT | Performed by: ORTHOPAEDIC SURGERY

## 2025-05-09 PROCEDURE — 20610 DRAIN/INJ JOINT/BURSA W/O US: CPT | Performed by: ORTHOPAEDIC SURGERY

## 2025-05-09 RX ADMIN — TRIAMCINOLONE ACETONIDE 40 MG: 40 INJECTION, SUSPENSION INTRA-ARTICULAR; INTRAMUSCULAR at 08:30

## 2025-05-09 RX ADMIN — ROPIVACAINE HYDROCHLORIDE 4 ML: 2 INJECTION, SOLUTION EPIDURAL; INFILTRATION; PERINEURAL at 08:30

## 2025-05-09 NOTE — ASSESSMENT & PLAN NOTE
Begin home exercises - MOON protocol  Modify activity PRN  Offered and administered left glenohumeral CSI 5/9/2025  OTC Aleve PRN  Orders:  •  Ambulatory Referral to Orthopedic Surgery  •  XR shoulder 2+ vw left; Future  •  Large joint arthrocentesis: L glenohumeral  •  ropivacaine (NAROPIN) injection 4 mL  •  triamcinolone acetonide (Kenalog-40) 40 mg/mL injection 40 mg

## 2025-05-09 NOTE — TELEPHONE ENCOUNTER
As a follow-up, a second attempt has been made for outreach via fax to facility. Please see Contacts section for details.    Thank you  Piyush Luna MA

## 2025-05-09 NOTE — PROGRESS NOTES
Patient Name: Ed Mccauley      : 1955       MRN: [id number]   Encounter Provider: Kishan Mcguire MD   Encounter Date: 25  Encounter department: [facility] [place]         Assessment & Plan  Primary osteoarthritis of left shoulder  Begin home exercises - MOON protocol  Modify activity PRN  Offered and administered left glenohumeral CSI 2025  OTC Aleve PRN  Orders:  •  Ambulatory Referral to Orthopedic Surgery  •  XR shoulder 2+ vw left; Future  •  Large joint arthrocentesis: L glenohumeral  •  ropivacaine (NAROPIN) injection 4 mL  •  triamcinolone acetonide (Kenalog-40) 40 mg/mL injection 40 mg       Plan:  Ed is a pleasant 69 y.o. male who presents for consultation of left shoulder pain. He is symptomatic of his moderate to severe underlying glenohumeral joint osteoarthritis. I recommend he begin either physical therapy or home exercises. He elected to proceed with a physician-directed home exercise program. This was provided to him today. I offered him a corticosteroid injection of the left glenohumeral joint. He consented to and tolerated the procedure well. Injection aftercare instructions were provided to him. The soonest he may receive a repeat corticosteroid injection is in 3-4 months. He will follow-up on an as needed basis in regards to his left shoulder pain.  We did discuss the possibility of arthroplasty in the future however he would like to exhaust all nonoperative treatment options.    Follow-up:  Return if symptoms worsen or fail to improve.     _____________________________________________________  CHIEF COMPLAINT:  Chief Complaint   Patient presents with   • Left Shoulder - Pain         SUBJECTIVE:  dE Mccauley is a 69 y.o. male who presents for consultation of left shoulder pain. He was referred by his PCP.  He was referred by Dr. Maria.  Notes reviewed.  The left shoulder has bothered him for a while; however, he noticed the pain  increased about a month ago after working out in the gym. He recalls left shoulder pain when he was working before he retired. He denies any surgical history of the left shoulder. He has had corticosteroid injections in the past with relief. His PCP prescribed him a Medrol Dosepak with some relief. He does also take over the counter Aleve as needed with some relief.    PAST MEDICAL HISTORY:  Past Medical History:   Diagnosis Date   • Acute pansinusitis    • Bicipital tendinitis of left shoulder    • GERD (gastroesophageal reflux disease)    • Hyperlipidemia    • Hypertension    • Lateral epicondylitis    • Noninfectious dermatoses of eyelid    • Pneumonia    • Rotator cuff tendinitis    • Sciatica    • Skin lesion        PAST SURGICAL HISTORY:  Past Surgical History:   Procedure Laterality Date   • EPIDURAL BLOCK INJECTION N/A 6/13/2018    Procedure: C6 C7 Cervical Epidural Steroid Injection;  Surgeon: Lucia Watts MD;  Location: [facility] MAIN OR;  Service: Pain Management    • EPIDURAL BLOCK INJECTION N/A 7/13/2018    Procedure: C6 C7 Cervical Epidural Steroid Injection (92860);  Surgeon: Lucia Watts MD;  Location: [facility] MAIN OR;  Service: Pain Management    • EPIDURAL BLOCK INJECTION N/A 10/12/2018    Procedure: C6 C7 Cervical Epidural Steroid Injection (73885);  Surgeon: Lucia Watts MD;  Location: [facility] MAIN OR;  Service: Pain Management    • EPIDURAL BLOCK INJECTION N/A 5/31/2019    Procedure: C6 C7 Cervical Epidural Steroid Injection (55895);  Surgeon: Lucia Watts MD;  Location: [facility] MAIN OR;  Service: Pain Management    • EPIDURAL BLOCK INJECTION N/A 8/12/2022    Procedure: C6 C7 CERVICAL epidural steroid injection (68436);  Surgeon: Lucia Watts MD;  Location: [facility] MAIN OR;  Service: Pain Management        FAMILY HISTORY:  Family History   Problem Relation Age of Onset   • No Known Problems Mother    • No Known Problems Father        SOCIAL HISTORY:  Social History     Tobacco Use   • Smoking  "status: Former     Current packs/day: 0.00     Types: Cigarettes     Start date: 3/30/1972     Quit date: 1987     Years since quittin.3     Passive exposure: Never   • Smokeless tobacco: Never   Vaping Use   • Vaping status: Never Used   Substance Use Topics   • Alcohol use: No   • Drug use: No       MEDICATIONS:    Current Outpatient Medications:   •  ascorbic acid (VITAMIN C) 500 mg tablet, Take 500 mg by mouth daily, Disp: , Rfl:   •  aspirin (ECOTRIN LOW STRENGTH) 81 mg EC tablet, Take 81 mg by mouth daily, Disp: , Rfl:   •  atorvastatin (LIPITOR) 10 mg tablet, TAKE 1 TABLET BY MOUTH EVERY DAY, Disp: 90 tablet, Rfl: 1  •  cyanocobalamin (VITAMIN B-12) 1,000 mcg tablet, Take by mouth daily, Disp: , Rfl:   •  lisinopril-hydrochlorothiazide (PRINZIDE,ZESTORETIC) 20-12.5 MG per tablet, TAKE 1 TABLET BY MOUTH EVERY DAY, Disp: 90 tablet, Rfl: 1  •  methylPREDNISolone 4 MG tablet therapy pack, Use as directed on package, Disp: 21 each, Rfl: 0  •  omeprazole (PriLOSEC) 40 MG capsule, TAKE 1 CAPSULE (40 MG TOTAL) BY MOUTH DAILY., Disp: 90 capsule, Rfl: 1    ALLERGIES:  No Known Allergies    LABS:  HgA1c:   Lab Results   Component Value Date    HGBA1C 6.1 (H) 2024     BMP:   Lab Results   Component Value Date    CALCIUM 9.8 2024    K 4.8 2024    CO2 31 2024     2024    BUN 17 2024    CREATININE 1.16 2024     CBC: No components found for: \"CBC\"    _____________________________________________________  Review of Systems   Constitutional:  Positive for activity change. Negative for chills and fever.   HENT:  Negative for ear pain and sore throat.    Eyes:  Negative for pain and visual disturbance.   Respiratory:  Negative for cough and shortness of breath.    Cardiovascular:  Negative for chest pain and palpitations.   Gastrointestinal:  Negative for abdominal pain and vomiting.   Genitourinary:  Negative for dysuria and hematuria.   Musculoskeletal:  Positive for " arthralgias and myalgias. Negative for back pain.   Skin:  Negative for color change and rash.   Neurological:  Negative for seizures and syncope.   All other systems reviewed and are negative.       Right Shoulder Exam     Range of Motion   External rotation:  60   Forward flexion:  160   Internal rotation 0 degrees:  T12     Muscle Strength   External rotation: 5/5   Supraspinatus: 5/5       Left Shoulder Exam     Range of Motion   External rotation:  60   Forward flexion:  150   Internal rotation 0 degrees:  L4     Muscle Strength   External rotation: 5/5   Supraspinatus: 5/5     Other   Erythema: absent  Scars: absent  Sensation: normal  Pulse: present     Comments:  Pain with Empty Can  Demonstrates normal elbow, wrist, and finger motion  2+  distal radial pulse with brisk capillary refill to the fingers  Radial, median, ulnar and motor  and sensory distribution intact  Sensation to light touch intact distally              Physical Exam  Vitals and nursing note reviewed.   Constitutional:       Appearance: Normal appearance.   HENT:      Head: Normocephalic and atraumatic.      Right Ear: External ear normal.      Left Ear: External ear normal.      Nose: Nose normal.   Eyes:      General: No scleral icterus.     Extraocular Movements: Extraocular movements intact.      Conjunctiva/sclera: Conjunctivae normal.   Cardiovascular:      Rate and Rhythm: Normal rate.   Pulmonary:      Effort: Pulmonary effort is normal. No respiratory distress.   Musculoskeletal:      Cervical back: Normal range of motion and neck supple.      Comments: See ortho exam   Skin:     General: Skin is warm and dry.   Neurological:      Mental Status: He is alert and oriented to person, place, and time.   Psychiatric:         Mood and Affect: Mood normal.         Behavior: Behavior normal.        _____________________________________________________  STUDIES REVIEWED:  I personally reviewed the pertinent images and my independent  "interpretation is as follows: X-rays of the left shoulder obtained in the office demonstrate moderate to severe narrowing of the glenohumeral joint. No acute fractures or dislocations.      PROCEDURES PERFORMED:  Large joint arthrocentesis: L glenohumeral    Performed by: Kishan Mcguire MD  Authorized by: Kishan Mcguire MD    Universal Protocol:  Consent: Verbal consent obtained.  Risks and benefits: risks, benefits and alternatives were discussed  Consent given by: patient  Time out: Immediately prior to procedure a \"time out\" was called to verify the correct patient, procedure, equipment, support staff and site/side marked as required.  Patient understanding: patient states understanding of the procedure being performed  Site marked: the operative site was marked  Patient identity confirmed: verbally with patient  Supporting Documentation  Indications: pain     Is this a Visco injection? NoProcedure Details  Location: shoulder - L glenohumeral  Preparation: Patient was prepped and draped in the usual sterile fashion  Needle gauge: 21 G.  Approach: anterior  Medications administered: 40 mg triamcinolone acetonide 40 mg/mL; 4 mL ropivacaine 0.2 %    Patient tolerance: patient tolerated the procedure well with no immediate complications  Dressing:  Sterile dressing applied           Scribe Attestation    I,:  Brittany Cortez am acting as a scribe while in the presence of the attending physician.:       I,:  Kishan Mcguire MD personally performed the services described in this documentation    as scribed in my presence.:          "

## 2025-05-12 RX ORDER — TRIAMCINOLONE ACETONIDE 40 MG/ML
40 INJECTION, SUSPENSION INTRA-ARTICULAR; INTRAMUSCULAR
Status: COMPLETED | OUTPATIENT
Start: 2025-05-09 | End: 2025-05-09

## 2025-05-12 RX ORDER — ROPIVACAINE HYDROCHLORIDE 2 MG/ML
4 INJECTION, SOLUTION EPIDURAL; INFILTRATION; PERINEURAL
Status: COMPLETED | OUTPATIENT
Start: 2025-05-09 | End: 2025-05-09

## 2025-05-22 NOTE — TELEPHONE ENCOUNTER
As a final attempt, a third outreach has been made via telephone call to facility. Please see Contacts section for details. This encounter will be closed and completed by end of day. Should we receive the requested information because of previous outreach attempts, the requested patient's chart will be updated appropriately.     Thank you  Piyush Luna MA

## 2025-08-14 ENCOUNTER — TELEPHONE (OUTPATIENT)
Dept: FAMILY MEDICINE CLINIC | Facility: CLINIC | Age: 70
End: 2025-08-14

## (undated) DEVICE — TOWEL SET X-RAY

## (undated) DEVICE — TRAY EPIDURAL PERIFIX 20GA X 3.5IN TUOHY 8ML

## (undated) DEVICE — NEEDLE BLUNT 18 G X 1 1/2 W FILTER

## (undated) DEVICE — RADIOLOGY STERILE LABELS: Brand: CENTURION

## (undated) DEVICE — GLOVE SRG BIOGEL 7.5

## (undated) DEVICE — PLASTIC ADHESIVE BANDAGE: Brand: CURITY

## (undated) DEVICE — SYRINGE 5ML LL

## (undated) DEVICE — BRACHIAL PLEXUS SET

## (undated) DEVICE — CHLORAPREP APPLICATOR TINTED 10.5ML ONE-STEP

## (undated) DEVICE — SMALL NEEDLE COUNTER NEST